# Patient Record
Sex: FEMALE | Race: WHITE | NOT HISPANIC OR LATINO | Employment: UNEMPLOYED | URBAN - METROPOLITAN AREA
[De-identification: names, ages, dates, MRNs, and addresses within clinical notes are randomized per-mention and may not be internally consistent; named-entity substitution may affect disease eponyms.]

---
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
## 2022-03-05 ENCOUNTER — HEALTH MAINTENANCE LETTER (OUTPATIENT)
Age: 22
End: 2022-03-05

## 2022-11-20 ENCOUNTER — HEALTH MAINTENANCE LETTER (OUTPATIENT)
Age: 22
End: 2022-11-20

## 2023-04-15 ENCOUNTER — HEALTH MAINTENANCE LETTER (OUTPATIENT)
Age: 23
End: 2023-04-15

## 2024-06-22 ENCOUNTER — HEALTH MAINTENANCE LETTER (OUTPATIENT)
Age: 24
End: 2024-06-22

## 2025-01-28 ENCOUNTER — TELEPHONE (OUTPATIENT)
Dept: ORTHOPEDICS | Facility: CLINIC | Age: 25
End: 2025-01-28
Payer: COMMERCIAL

## 2025-01-28 NOTE — TELEPHONE ENCOUNTER
Patient confirmed scheduled appointment:  Date: 2/3/2025  Time: 10:00am  Visit type: NEW KNEE  Provider:   [place] [facility] [place]

## 2025-01-28 NOTE — TELEPHONE ENCOUNTER
Patient Contacted and writer spoke to pt mother and mother stated she wanted to see  for second opinion. Writer gave pt mother information on next available appt with . Mother was told that writer was to schedule pt with  and was told about next available appt with . Mother was aware that she had to have C2C on file to schedule pt appt and mother said she will have daughter to call back and she the following:   Appointment type: NEW KNEE  Provider:   Return date: 2/3 or 2/5

## 2025-01-29 ENCOUNTER — TELEPHONE (OUTPATIENT)
Dept: ORTHOPEDICS | Facility: CLINIC | Age: 25
End: 2025-01-29
Payer: COMMERCIAL

## 2025-01-29 ENCOUNTER — PRE VISIT (OUTPATIENT)
Dept: ORTHOPEDICS | Facility: CLINIC | Age: 25
End: 2025-01-29
Payer: COMMERCIAL

## 2025-01-29 NOTE — TELEPHONE ENCOUNTER
Left a message for the patients family regarding questions they had about their upcoming appointment with Dr. Gregg. Call back number was given.     ANTONY Lopes

## 2025-01-29 NOTE — TELEPHONE ENCOUNTER
Pt mother called writer to see if she could speak to someone from  team about surgery. Writer told pt mother I will I have someone from his team to reach out to her and speak with them. Mother understood and agreed.

## 2025-01-29 NOTE — TELEPHONE ENCOUNTER
Action January 29, 2025 9:46 AM MT   Action Taken Sent a request for imaging from [facility] via [employer].       DIAGNOSIS: LEFT KNEE INJURY   APPOINTMENT DATE: 02/03/2025   NOTES STATUS DETAILS   OFFICE NOTE from referring provider SELF    OFFICE NOTE from other specialist Care Everywhere 01/23/2025 - Shoaib Levy MD -  Sports Med   DISCHARGE REPORT from the ER Care Everywhere 1/21/2025  [facility] EMERGENCY DEPARTMENT   MRI In process [facility]:  01/23/2025 - Left Knee   XRAYS (IMAGES & REPORTS) In process [facility]:  01/21/2025 - Left Knee  01/21/2025 - Left Tib-Fib

## 2025-01-30 ENCOUNTER — TELEPHONE (OUTPATIENT)
Dept: ORTHOPEDICS | Facility: CLINIC | Age: 25
End: 2025-01-30
Payer: COMMERCIAL

## 2025-01-30 NOTE — TELEPHONE ENCOUNTER
Other: Patient and her mom called and they are wanting to make sure that they got everything over to the provider for her 2/3/25 appointment. Please call patient.     Could we send this information to you in [employer] or would you prefer to receive a phone call?:   Patient would prefer a phone call   Okay to leave a detailed message?: Yes at Cell number on file:    Telephone Information:   Mobile [phone]   Mobile [phone]

## 2025-01-30 NOTE — TELEPHONE ENCOUNTER
Patient was called and informed that we received the imaging needed for her upcoming visit with Dr. Gregg on Monday.     Mich Connors CMA

## 2025-02-03 ENCOUNTER — OFFICE VISIT (OUTPATIENT)
Dept: ORTHOPEDICS | Facility: CLINIC | Age: 25
End: 2025-02-03
Payer: COMMERCIAL

## 2025-02-03 ENCOUNTER — TELEPHONE (OUTPATIENT)
Dept: ORTHOPEDICS | Facility: CLINIC | Age: 25
End: 2025-02-03

## 2025-02-03 DIAGNOSIS — S83.412A SPRAIN OF MEDIAL COLLATERAL LIGAMENT OF LEFT KNEE, INITIAL ENCOUNTER: ICD-10-CM

## 2025-02-03 DIAGNOSIS — S83.512A RUPTURE OF ANTERIOR CRUCIATE LIGAMENT OF LEFT KNEE, INITIAL ENCOUNTER: Primary | ICD-10-CM

## 2025-02-03 DIAGNOSIS — S83.242A OTHER TEAR OF MEDIAL MENISCUS OF LEFT KNEE AS CURRENT INJURY, INITIAL ENCOUNTER: ICD-10-CM

## 2025-02-03 PROCEDURE — 99204 OFFICE O/P NEW MOD 45 MIN: CPT | Mod: GC | Performed by: ORTHOPAEDIC SURGERY

## 2025-02-03 NOTE — PROGRESS NOTES
"[facility]  ORTHOPAEDIC SURGERY CONSULT - HISTORY AND PHYSICAL    DATE OF CONSULT: 2/3/2025 11:06 AM    REQUESTING PROVIDER: No att. providers found    CC: Pain, Left knee    DATE OF INJURY: 1/21/2025    HISTORY OF PRESENT ILLNESS:   Silver Agee is a 24 year old who presents with Left knee pain and swelling. She was playing volleyball on 1/21/25 when she came down and her knee \"caved in\". She felt a pop and had pain and immediate swelling and could not continue. She was seen in the ED where x-rays were negative for osseous abnormality. She was placed in a knee immobilizer and given crutches. Currently, her pain and swelling have reduced. She rated her pain at 2/10 in morning, which improves with PT exercises. She used ice, rest, brace, crutches for symptom control. She was placed in PT, who gave her some home exercise during a virtual visit. She just graduated and stays in [place]. She plays volleyball recreationally, and works out in gym. She denies prior injury/surgery to the affected knee. She denies tingling/numbness in the affected knee.       PAST MEDICAL HISTORY:   No past medical history on file.    Patient denies any personal history of bleeding disorders, clotting disorders, or adverse reactions to anesthesia.    PAST SURGICAL HISTORY:   No past surgical history on file.      MEDICATIONS:   Prior to Admission medications    Not on File       ALLERGIES:   Polytrim [polymyxin b-trimethoprim]    FAMILY HISTORY:  Patient denies known family history of bleeding, clotting, or anesthesia related complications.     REVIEW OF SYSTEMS:   Otherwise, a 10-point reviews of systems was negative except as noted above in the HPI.     PHYSICAL EXAM:   There were no vitals taken for this visit.  General: Awake, alert, cooperative, no apparent distress, appears stated age  Musculoskeletal:  General: Alert, oriented, no distress. Cooperative and comfortable during exam.  Left Lower Extremity:  No hip " "pain with passive internal and external rotation. Extensor mechanism is intact. Active knee ROM: painful, upto 30 degree.  No ecchymosis or erythema noted about the knee. No previous incisions are noted. No soft tissue swelling. Moderate knee effusion. Tender on the medial joint line. Non tender on lateral joint line. Non tender on the pes anserine bursa. 2B Lachman's test. Varus and valgus stress test deferred due to pain.  Calf is soft to palpation. Distal neurovascular exam is within normal limits.    Distal neurovascular exam is within normal limits.     LABS:  CBC:  Lab Results   Component Value Date    WBC 6.6 04/25/2019    HGB 12.6 04/25/2019     04/25/2019     BMP:  No results found for: \"NA\", \"POTASSIUM\", \"CHLORIDE\", \"CO2\", \"BUN\", \"CR\", \"ANIONGAP\", \"CADE\", \"GLC\"  Inflammatory Markers:  Lab Results   Component Value Date    WBC 6.6 04/25/2019       No results found for: \"INR\"  No results found for: \"GLC\"    IMAGING:  Review of MRI- Left knee from 01/23/2025 demonstrate:   IMPRESSION:   1. Full-thickness tear of the anterior cruciate ligament.   2.  Multiplanar, primarily horizontal tear of the posterior horn of the medial meniscus.   3.  There is possible fraying of the lateral meniscus posterior root ligament.   4.  Grade 2 injury of the MCL.   5.  Osseous contusions at the sulcus terminalis in the lateral femoral condyle and the posterior tibial plateau laterally and medially.   6.  Popliteus strain.     Assessment:   1. Complete ACL tear, Left knee  2. Medial meniscal posterior horn tear, Left camden  3. Grade 2 MCL sprain.     Plan:  We discussed the clinical and radiographic findings. We discussed the diagnosis and questions were answered. We discussed management options.   We discussed a surgical procedure of an arthroscopic-assisted ACL reconstruction. I described the procedure in detail. We discussed graft options, including patellar tendon versus quadriceps autograft tissue. We discussed the " specific risks of allograft tissue. Questions were answered regarding this. We discussed the risks and potential complications of surgery including continued pain, infection, blood vessel or nerve injury, swelling, fracture, failure of the graft, recurrent instability, development of degenerative joint disease, or the potential need for further surgery. We also discussed treatment of meniscus tears including partial menisectomy, meniscus repair, and benign neglect. We discussed the indications for meniscal repair. We discussed healing rates of meniscal tissue. We discussed the ramifications of a partial meniscectomy. We discussed the typical rehabilitation process following surgery and her questions were answered.   At this point the patient would like to discuss with family and  decide whether to have surgery with us or in [place],where she lives. She also has a trip to [place] coming up in 5 weeks. We discussed about working with PT to improve her knee motion and get the surgery done after she returns from her trip.   Patient to call and schedule surgery if she wants to get it done here.       Tee Romero MD  Orthopaedic Sports Medicine Fellow     For questions about this patient during weekday business hours, please attempt to contact me at my pager prior to contacting the Orthopaedic Surgery resident on call. On the weekends and overnight, please page the Orthopaedic Surgery resident on call. Thank you!

## 2025-02-03 NOTE — LETTER
"2/3/2025      Silver Agee  [address] [address]      Dear Colleague,    Thank you for referring your patient, Silver Agee, to the [facility] [place]. Please see a copy of my visit note below.    [facility]  ORTHOPAEDIC SURGERY CONSULT - HISTORY AND PHYSICAL    DATE OF CONSULT: 2/3/2025 11:06 AM    REQUESTING PROVIDER: No att. providers found    CC: Pain, Left knee    DATE OF INJURY: 1/21/2025    HISTORY OF PRESENT ILLNESS:   Silver Agee is a 24 year old who presents with Left knee pain and swelling. She was playing volleyball on 1/21/25 when she came down and her knee \"caved in\". She felt a pop and had pain and immediate swelling and could not continue. She was seen in the ED where x-rays were negative for osseous abnormality. She was placed in a knee immobilizer and given crutches. Currently, her pain and swelling have reduced. She rated her pain at 2/10 in morning, which improves with PT exercises. She used ice, rest, brace, crutches for symptom control. She was placed in PT, who gave her some home exercise during a virtual visit. She just graduated and stays in [place]. She plays volleyball recreationally, and works out in gym. She denies prior injury/surgery to the affected knee. She denies tingling/numbness in the affected knee.       PAST MEDICAL HISTORY:   No past medical history on file.    Patient denies any personal history of bleeding disorders, clotting disorders, or adverse reactions to anesthesia.    PAST SURGICAL HISTORY:   No past surgical history on file.      MEDICATIONS:   Prior to Admission medications    Not on File       ALLERGIES:   Polytrim [polymyxin b-trimethoprim]    FAMILY HISTORY:  Patient denies known family history of bleeding, clotting, or anesthesia related complications.     REVIEW OF SYSTEMS:   Otherwise, a 10-point reviews of systems was negative except as noted above in the HPI. " "    PHYSICAL EXAM:   There were no vitals taken for this visit.  General: Awake, alert, cooperative, no apparent distress, appears stated age  Musculoskeletal:  General: Alert, oriented, no distress. Cooperative and comfortable during exam.  Left Lower Extremity:  No hip pain with passive internal and external rotation. Extensor mechanism is intact. Active knee ROM: painful, upto 30 degree.  No ecchymosis or erythema noted about the knee. No previous incisions are noted. No soft tissue swelling. Moderate knee effusion. Tender on the medial joint line. Non tender on lateral joint line. Non tender on the pes anserine bursa. 2B Lachman's test. Varus and valgus stress test deferred due to pain.  Calf is soft to palpation. Distal neurovascular exam is within normal limits.    Distal neurovascular exam is within normal limits.     LABS:  CBC:  Lab Results   Component Value Date    WBC 6.6 04/25/2019    HGB 12.6 04/25/2019     04/25/2019     BMP:  No results found for: \"NA\", \"POTASSIUM\", \"CHLORIDE\", \"CO2\", \"BUN\", \"CR\", \"ANIONGAP\", \"CADE\", \"GLC\"  Inflammatory Markers:  Lab Results   Component Value Date    WBC 6.6 04/25/2019       No results found for: \"INR\"  No results found for: \"GLC\"    IMAGING:  Review of MRI- Left knee from 01/23/2025 demonstrate:   IMPRESSION:   1. Full-thickness tear of the anterior cruciate ligament.   2.  Multiplanar, primarily horizontal tear of the posterior horn of the medial meniscus.   3.  There is possible fraying of the lateral meniscus posterior root ligament.   4.  Grade 2 injury of the MCL.   5.  Osseous contusions at the sulcus terminalis in the lateral femoral condyle and the posterior tibial plateau laterally and medially.   6.  Popliteus strain.     Assessment:   1. Complete ACL tear, Left knee  2. Medial meniscal posterior horn tear, Left camden  3. Grade 2 MCL sprain.     Plan:  We discussed the clinical and radiographic findings. We discussed the diagnosis and questions were " answered. We discussed management options.   We discussed a surgical procedure of an arthroscopic-assisted ACL reconstruction. I described the procedure in detail. We discussed graft options, including patellar tendon versus quadriceps autograft tissue. We discussed the specific risks of allograft tissue. Questions were answered regarding this. We discussed the risks and potential complications of surgery including continued pain, infection, blood vessel or nerve injury, swelling, fracture, failure of the graft, recurrent instability, development of degenerative joint disease, or the potential need for further surgery. We also discussed treatment of meniscus tears including partial menisectomy, meniscus repair, and benign neglect. We discussed the indications for meniscal repair. We discussed healing rates of meniscal tissue. We discussed the ramifications of a partial meniscectomy. We discussed the typical rehabilitation process following surgery and her questions were answered.   At this point the patient would like to discuss with family and  decide whether to have surgery with us or in [place],where she lives. She also has a trip to [place] coming up in 5 weeks. We discussed about working with PT to improve her knee motion and get the surgery done after she returns from her trip.   Patient to call and schedule surgery if she wants to get it done here.       Tee Romero MD  Orthopaedic Sports Medicine Fellow     For questions about this patient during weekday business hours, please attempt to contact me at my pager prior to contacting the Orthopaedic Surgery resident on call. On the weekends and overnight, please page the Orthopaedic Surgery resident on call. Thank you!      Patient seen and examined with the fellow. I also personally reviewed the images and interpreted the imaging myself.     Assesment: Grade 3 rupture of the anterior cruciate ligament left knee, moderate grade sprain of the medial collateral  ligament left knee no definitive meniscus tears     Plan: Long discussion with the patient.  Viewed the diagnosis but of treatment options.  Offered ACL reconstruction quad tendon versus patellar tendon autograft.  We discussed the pros cons risk benefits of surgery versus none surgery.  We discussed expected course becoming alternative treatment options.  I do not think the MCL will require any surgical intervention however we will not know this till we areDoing the surgery.  Is a decision will be made intraoperatively.    We discussed the pros cons risk benefits of surgery versus none surgery.  Discussed expected course becoming alternative treatment options.  Will look for time to schedule to get this completed.  The patient does largely live in [place] and there will be a decision point that the family needs to make whether he would like surgery here in the [facility] or back in medicine.  I told him that I support needs away happy to help out.    If they are interested with surgery with me here at the [facility] I did offer them enrollment in the stability2 trial as I think this would be patient would be a good candidate.  However they need to decide the site of surgery first.    I agree with history, physical and imaging as well as the assessment and plan as detailed by Dr. Romero.         Again, thank you for allowing me to participate in the care of your patient.      Sincerely,    Johnathan Gregg MD    Electronically signed

## 2025-02-03 NOTE — PROGRESS NOTES
Patient seen and examined with the fellow. I also personally reviewed the images and interpreted the imaging myself.     Assesment: Grade 3 rupture of the anterior cruciate ligament left knee, moderate grade sprain of the medial collateral ligament left knee no definitive meniscus tears     Plan: Long discussion with the patient.  Viewed the diagnosis but of treatment options.  Offered ACL reconstruction quad tendon versus patellar tendon autograft.  We discussed the pros cons risk benefits of surgery versus none surgery.  We discussed expected course becoming alternative treatment options.  I do not think the MCL will require any surgical intervention however we will not know this till we areDoing the surgery.  Is a decision will be made intraoperatively.    We discussed the pros cons risk benefits of surgery versus none surgery.  Discussed expected course becoming alternative treatment options.  Will look for time to schedule to get this completed.  The patient does largely live in [place] and there will be a decision point that the family needs to make whether he would like surgery here in the [facility] or back in medicine.  I told him that I support needs away happy to help out.    If they are interested with surgery with me here at the [facility] I did offer them enrollment in the stability2 trial as I think this would be patient would be a good candidate.  However they need to decide the site of surgery first.    I agree with history, physical and imaging as well as the assessment and plan as detailed by Dr. Romero.

## 2025-02-03 NOTE — NURSING NOTE
Reason For Visit:   Chief Complaint   Patient presents with    Consult     Left knee          ?  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  Currently working? No.  Work status?   Student .  Date of injury: A week and a half ago  Type of injury: Volleyball injury .  Date of surgery: N/A  Type of surgery: N/A.  Smoker: No  Request smoking cessation information: No    SANE Score  Left Knee: 0  Right Knee: 100    Pain Assessment  Patient Currently in Pain: Denies    There were no vitals taken for this visit.         Allergies   Allergen Reactions    Polytrim [Polymyxin B-Trimethoprim] Swelling       No current outpatient medications on file.     No current facility-administered medications for this visit.         Mich Connors, CMA

## 2025-02-03 NOTE — TELEPHONE ENCOUNTER
Procedure: ACL and meniscus surgery  Facility: [facility]  Length: 90 minutes  Anesthesia: Choice  Post-op appointments needed: 2 weeks provider only, 6 weeks with provider only.  Surgery packet/instructions given to patient?  No    May have surgery in [place]    Fransisca Reina RN

## 2025-02-05 NOTE — TELEPHONE ENCOUNTER
Patient is scheduled for surgery with Dr. Gregg    Spoke with: Patient    Date of Surgery: 3/18/25    Location: ASC    Post ops: 1 week with PA, 6 weeks with MD    Pre op with Provider: Complete    H&P: Will schedule in [place]    Additional imaging/appointments: N/A    Surgery packet: Would like via [employer]     Additional comments: N/A        Debbie Durant MA on 2/5/2025 at 11:12 AM

## 2025-02-06 PROBLEM — S83.512A RUPTURE OF ANTERIOR CRUCIATE LIGAMENT OF LEFT KNEE, INITIAL ENCOUNTER: Status: ACTIVE | Noted: 2025-02-03

## 2025-02-25 NOTE — TELEPHONE ENCOUNTER
FUTURE VISIT INFORMATION      SURGERY INFORMATION:  Date: 3/18/25  Location:  or  Surgeon:  Johnathan Gregg MD   Anesthesia Type:  choice  Procedure: Examination under anesthesia, knee arthroscopy, bone tendon bone autograft versus quad tendon autograft anterior cruciate ligament reconstruction, meniscus surgery.possible extra articular tenodesis   Consult: ov 2/3/25    RECORDS REQUESTED FROM:       Primary Care Provider: Priscilla Lainez MD

## 2025-03-04 ENCOUNTER — PREP FOR PROCEDURE (OUTPATIENT)
Dept: ORTHOPEDICS | Facility: CLINIC | Age: 25
End: 2025-03-04
Payer: COMMERCIAL

## 2025-03-17 ENCOUNTER — PRE VISIT (OUTPATIENT)
Dept: SURGERY | Facility: CLINIC | Age: 25
End: 2025-03-17

## 2025-03-17 ENCOUNTER — OFFICE VISIT (OUTPATIENT)
Dept: SURGERY | Facility: CLINIC | Age: 25
End: 2025-03-17
Payer: COMMERCIAL

## 2025-03-17 ENCOUNTER — LAB (OUTPATIENT)
Dept: LAB | Facility: CLINIC | Age: 25
End: 2025-03-17
Payer: COMMERCIAL

## 2025-03-17 ENCOUNTER — ANESTHESIA EVENT (OUTPATIENT)
Dept: SURGERY | Facility: AMBULATORY SURGERY CENTER | Age: 25
End: 2025-03-17
Payer: COMMERCIAL

## 2025-03-17 VITALS
TEMPERATURE: 98.3 F | RESPIRATION RATE: 16 BRPM | BODY MASS INDEX: 26.07 KG/M2 | DIASTOLIC BLOOD PRESSURE: 66 MMHG | WEIGHT: 176 LBS | HEIGHT: 69 IN | SYSTOLIC BLOOD PRESSURE: 106 MMHG | HEART RATE: 68 BPM | OXYGEN SATURATION: 98 %

## 2025-03-17 DIAGNOSIS — S83.512A RUPTURE OF ANTERIOR CRUCIATE LIGAMENT OF LEFT KNEE, INITIAL ENCOUNTER: ICD-10-CM

## 2025-03-17 DIAGNOSIS — Z01.818 PREOP EXAMINATION: ICD-10-CM

## 2025-03-17 DIAGNOSIS — Z01.818 PREOP EXAMINATION: Primary | ICD-10-CM

## 2025-03-17 LAB
ANION GAP SERPL CALCULATED.3IONS-SCNC: 10 MMOL/L (ref 7–15)
BUN SERPL-MCNC: 13.9 MG/DL (ref 6–20)
CALCIUM SERPL-MCNC: 9.8 MG/DL (ref 8.8–10.4)
CHLORIDE SERPL-SCNC: 102 MMOL/L (ref 98–107)
CREAT SERPL-MCNC: 0.81 MG/DL (ref 0.51–0.95)
EGFRCR SERPLBLD CKD-EPI 2021: >90 ML/MIN/1.73M2
ERYTHROCYTE [DISTWIDTH] IN BLOOD BY AUTOMATED COUNT: 12.5 % (ref 10–15)
GLUCOSE SERPL-MCNC: 85 MG/DL (ref 70–99)
HCO3 SERPL-SCNC: 27 MMOL/L (ref 22–29)
HCT VFR BLD AUTO: 40.4 % (ref 35–47)
HGB BLD-MCNC: 13.4 G/DL (ref 11.7–15.7)
MCH RBC QN AUTO: 28.7 PG (ref 26.5–33)
MCHC RBC AUTO-ENTMCNC: 33.2 G/DL (ref 31.5–36.5)
MCV RBC AUTO: 87 FL (ref 78–100)
PLATELET # BLD AUTO: 193 10E3/UL (ref 150–450)
POTASSIUM SERPL-SCNC: 3.8 MMOL/L (ref 3.4–5.3)
RBC # BLD AUTO: 4.67 10E6/UL (ref 3.8–5.2)
SODIUM SERPL-SCNC: 139 MMOL/L (ref 135–145)
WBC # BLD AUTO: 7.9 10E3/UL (ref 4–11)

## 2025-03-17 PROCEDURE — 99203 OFFICE O/P NEW LOW 30 MIN: CPT | Performed by: PHYSICIAN ASSISTANT

## 2025-03-17 PROCEDURE — 85027 COMPLETE CBC AUTOMATED: CPT | Performed by: PATHOLOGY

## 2025-03-17 PROCEDURE — 80048 BASIC METABOLIC PNL TOTAL CA: CPT | Performed by: PATHOLOGY

## 2025-03-17 PROCEDURE — 36415 COLL VENOUS BLD VENIPUNCTURE: CPT | Performed by: PATHOLOGY

## 2025-03-17 ASSESSMENT — LIFESTYLE VARIABLES: TOBACCO_USE: 0

## 2025-03-17 ASSESSMENT — ENCOUNTER SYMPTOMS: SEIZURES: 0

## 2025-03-17 ASSESSMENT — PAIN SCALES - GENERAL: PAINLEVEL_OUTOF10: NO PAIN (0)

## 2025-03-17 NOTE — ANESTHESIA PREPROCEDURE EVALUATION
"Anesthesia Pre-Procedure Evaluation    Patient: Silver Agee   MRN: [id number] : 2000        Procedure : Procedure(s):  Examination under anesthesia, knee arthroscopy, bone tendon bone autograft  versus quad tendon autograft anterior cruciate ligament reconstruction, possible extra articular tenodesis  meniscus surgery.          Past Medical History:   Diagnosis Date    Rupture of anterior cruciate ligament of left knee 2025      No past surgical history on file.   Allergies   Allergen Reactions    Polymyxin B-Trimethoprim Swelling    Polytrim [Polymyxin B-Trimethoprim] Swelling      Social History     Tobacco Use    Smoking status: Never    Smokeless tobacco: Never   Substance Use Topics    Alcohol use: No      Wt Readings from Last 1 Encounters:   25 79.8 kg (176 lb)        Anesthesia Evaluation   Pt has not had prior anesthetic         ROS/MED HX  ENT/Pulmonary:  - neg pulmonary ROS     Neurologic:  - neg neurologic ROS     Cardiovascular:  - neg cardiovascular ROS     METS/Exercise Tolerance:     Hematologic:  - neg hematologic  ROS     Musculoskeletal:  - neg musculoskeletal ROS     GI/Hepatic:  - neg GI/hepatic ROS     Renal/Genitourinary:  - neg Renal ROS     Endo:  - neg endo ROS     Psychiatric/Substance Use:  - neg psychiatric ROS     Infectious Disease:  - neg infectious disease ROS     Malignancy:  - neg malignancy ROS     Other:  - neg other ROS          Physical Exam    Airway        Mallampati: I   TM distance: > 3 FB   Neck ROM: full   Mouth opening: > 3 cm    Respiratory Devices and Support         Dental       (+) Minor Abnormalities - some fillings, tiny chips      Cardiovascular   cardiovascular exam normal          Pulmonary   pulmonary exam normal                OUTSIDE LABS:  CBC:   Lab Results   Component Value Date    WBC 6.6 2019    HGB 12.6 2019    HGB 13.3 2017    HCT 38.2 2019     2019     BMP: No results found for: \"NA\", " "\"POTASSIUM\", \"CHLORIDE\", \"CO2\", \"BUN\", \"CR\", \"GLC\"  COAGS: No results found for: \"PTT\", \"INR\", \"FIBR\"  POC:   Lab Results   Component Value Date    HCG Negative 04/25/2019     HEPATIC: No results found for: \"ALBUMIN\", \"PROTTOTAL\", \"ALT\", \"AST\", \"GGT\", \"ALKPHOS\", \"BILITOTAL\", \"BILIDIRECT\", \"ALTAF\"  OTHER: No results found for: \"PH\", \"LACT\", \"A1C\", \"CADE\", \"PHOS\", \"MAG\", \"LIPASE\", \"AMYLASE\", \"TSH\", \"T4\", \"T3\", \"CRP\", \"SED\"    Anesthesia Plan    ASA Status:  1    NPO Status:  NPO Appropriate    Anesthesia Type: General.     - Airway: LMA   Induction: Intravenous, Propofol.   Maintenance: Balanced.        Consents    Anesthesia Plan(s) and associated risks, benefits, and realistic alternatives discussed. Questions answered and patient/representative(s) expressed understanding.     - Discussed: Risks, Benefits and Alternatives for BOTH SEDATION and the PROCEDURE were discussed     - Discussed with:  Patient      - Extended Intubation/Ventilatory Support Discussed: No.      - Patient is DNR/DNI Status: No     Use of blood products discussed: No .     Postoperative Care    Pain management: IV analgesics, Oral pain medications, Multi-modal analgesia, Peripheral nerve block (Single Shot).   PONV prophylaxis: Ondansetron (or other 5HT-3), Dexamethasone or Solumedrol, Background Propofol Infusion     Comments:               Dudley Reyes MD    I have reviewed the pertinent notes and labs in the chart from the past 30 days and (re)examined the patient.  Any updates or changes from those notes are reflected in this note.    Clinically Significant Risk Factors Present on Admission                             # Overweight: Estimated body mass index is 25.99 kg/m  as calculated from the following:    Height as of 3/17/25: 1.753 m (5' 9\").    Weight as of 3/17/25: 79.8 kg (176 lb).                "

## 2025-03-17 NOTE — PATIENT INSTRUCTIONS
Preparing for Your Surgery      Name:  Silver Agee   MRN:  [id number]   :  2000   Today's Date:  3/17/2025         Arriving for surgery:  Surgery date:  3/18/25  Arrival time:  6:30 am  Surgery time: 8:00 am    Restrictions due to COVID 19:    Please maintain social distance.  Masking is optional        parking is available for anyone with mobility limitations or disabilities. (Monday- Friday 7 am- 5 pm)    Please come to:    [facility]  [address] [place] [id number]    Check in on the 5th floor, Ambulatory Surgery Center.    What can I eat or drink?    -  You may eat and drink normally until 8 hours before arrival time  (Until 10:30 pm on 3/17/25)  -  You may have clear liquids until 2 hours before arrival time  (Until 4:30 am on 3/18/25)    Examples of clear liquids:  Water  Clear broth  Juices (apple, white grape, white cranberry  and cider) without pulp  Noncarbonated, powder based beverages  (lemonade and Dequan-Aid)  Sodas (Sprite, 7-Up, ginger ale and seltzer)  Coffee or tea (without milk or cream)  Gatorade    --No alcohol or cannabis products for at least 24 hours before surgery    Which medicines can I take?    Hold Aspirin for 7 days before surgery.   Hold Multivitamins for 7 days before surgery.  Hold Supplements for 7 days before surgery.  Hold Ibuprofen (Advil, Motrin) for 1 day before surgery--unless otherwise directed by surgeon.  Hold Naproxen (Aleve) for 4 days before surgery.      How do I prepare myself?  - Please take 2 showers (one the night prior to surgery and one the morning of surgery) using Scrubcare or Hibiclens soap.    Use this soap only from the neck to your toes. Avoid genital area      Leave the soap on your skin for one minute--then rinse thoroughly.      You may use your own shampoo and conditioner; no other hair products.   - Please remove all jewelry and body piercings.  - No lotions, deodorants or fragrance.  - No makeup or  fingernail polish.   - Bring your ID and insurance card.    -If you have a Deep Brain Stimulator, a Spinal Cord Stimulator or any implanted Neuro device you must bring the remote to the Surgery Center          ALL PATIENTS ARE REQUIRED TO HAVE A RESPONSIBLE ADULT TO DRIVE AND BE IN ATTENDANCE WITH THEM FOR 24 HOURS FOLLOWING SURGERY       Covid testing policy as of 12/06/2022  Your surgeon will notify and schedule you for a COVID test if one is needed before surgery--please direct any questions or COVID symptoms to your surgeon      Questions or Concerns:    -For questions regarding the day of surgery please contact the Ambulatory Surgery Center at [phone].    -If you have health changes between today and your surgery please contact your surgeon.     For questions after surgery please call your surgeons office.

## 2025-03-17 NOTE — H&P
Pre-Operative H & P     CC:  Preoperative exam to assess for increased cardiopulmonary risk while undergoing surgery and anesthesia.    Date of Encounter: 3/17/2025  Primary Care Physician:  Priscilla Lainez     Reason for visit:   Encounter Diagnoses   Name Primary?    Rupture of anterior cruciate ligament of left knee, initial encounter Yes    Preop examination        HPI  Silver Agee is a 25 year old female who presents for pre-operative H & P in preparation for  Procedure Information       Case: [id number] Date/Time: 03/18/25 0800    Procedures:       Examination under anesthesia, knee arthroscopy, bone tendon bone autograft (Left: Knee)      versus quad tendon autograft anterior cruciate ligament reconstruction, possible extra articular tenodesis (Left: Knee)      meniscus surgery. (Left: Knee)    Anesthesia type: General    Diagnosis: Rupture of anterior cruciate ligament of left knee, initial encounter [S83.512A]    Pre-op diagnosis: Rupture of anterior cruciate ligament of left knee, initial encounter [S83.512A]    Location: [place] [address] / [facility]    Providers: Johnatahn Gregg MD            Ms. Agee sustained a left knee injury while playing volleyball in January. Imaging demonstrates a complete ACL tear, a medial meniscal posterior horn tear and a grade 2 MCL sprain. She has been counseled by Dr. Gregg and now presents for the above procedure.    PMH is otherwise unremarkable.     History is obtained from the patient and chart review. She is accompanied by her parents.    Hx of abnormal bleeding or anti-platelet use: denies    Menstrual history: No LMP recorded. (Menstrual status: IUD).:       Past Medical History  Past Medical History:   Diagnosis Date    Rupture of anterior cruciate ligament of left knee 02/2025       Past Surgical History  History reviewed. No pertinent surgical history.    Prior to Admission  Medications  Current Outpatient Medications   Medication Sig Dispense Refill    levonorgestrel (MIRENA) 52 MG (20 mcg/day) IUD by Intrauterine route once.         Allergies  Allergies   Allergen Reactions    Polymyxin B-Trimethoprim Swelling    Polytrim [Polymyxin B-Trimethoprim] Swelling       Social History  Social History     Socioeconomic History    Marital status: Single     Spouse name: Not on file    Number of children: Not on file    Years of education: Not on file    Highest education level: Not on file   Occupational History    Not on file   Tobacco Use    Smoking status: Never    Smokeless tobacco: Never   Substance and Sexual Activity    Alcohol use: No    Drug use: No    Sexual activity: Not on file   Other Topics Concern    Not on file   Social History Narrative    FAMILY INFORMATION     Date: 2006    Parent #1      Name: Suzy Leyva   Gender: Female   : 62      Education: PHD   Occupation:         Parent #2      Name: Milton Agee   Gender: Male    : 56    Education:PHD  Occupation: [employer]        Siblings: none        Relationship Status of Parent(s):     Who does the child live with? parents    What language(s) is/are spoken at home? english         Social Drivers of Health     Financial Resource Strain: Not on file   Food Insecurity: No Food Insecurity (2025)    [facility] [place] [facility] [place]    Hunger Vital Sign     Worried About Running Out of Food in the Last Year: Never true     Ran Out of Food in the Last Year: Never true   Transportation Needs: Not on file   Physical Activity: Not on file   Stress: Not on file   Social Connections: Not on file   Interpersonal Safety: Not on file   Housing Stability: Not on file       Family History  Family History   Problem Relation Age of Onset    Anesthesia Reaction Mother         PONV    Factor V Leiden deficiency Mother         No h/o thrombus     "Hypertension Father     Diabetes Maternal Grandmother     Hypertension Maternal Grandmother     [email or web address]. Maternal Grandmother     Cerebrovascular Disease Maternal Grandfather     PAXTONA.D. Paternal Grandmother     Depression Maternal Aunt     Thyroid Disease Maternal Aunt     Glaucoma No family hx of     Macular Degeneration No family hx of        Review of Systems  The complete review of systems is negative other than noted in the HPI or here.     Anesthesia Evaluation   Pt has not had prior anesthetic         ROS/MED HX  ENT/Pulmonary:  - neg pulmonary ROS  (-) tobacco use, asthma, sleep apnea and recent URI   Neurologic:  - neg neurologic ROS  (-) no seizures and no CVA   Cardiovascular:       METS/Exercise Tolerance: >4 METS    Hematologic:  - neg hematologic  ROS  (-) history of blood clots and history of blood transfusion   Musculoskeletal: Comment: Complete left ACL tear and left medial meniscal posterior horn tear, acute        GI/Hepatic:  - neg GI/hepatic ROS  (-) GERD and liver disease   Renal/Genitourinary:  - neg Renal ROS  (-) renal disease   Endo:  - neg endo ROS  (-) Type II DM   Psychiatric/Substance Use:  - neg psychiatric ROS     Infectious Disease:  - neg infectious disease ROS     Malignancy:  - neg malignancy ROS     Other:  - neg other ROS          /66 (BP Location: Right arm, Patient Position: Sitting, Cuff Size: Adult Large)   Pulse 68   Temp 98.3  F (36.8  C) (Oral)   Resp 16   Ht 1.753 m (5' 9\")   Wt 79.8 kg (176 lb)   SpO2 98%   Breastfeeding No   BMI 25.99 kg/m      Physical Exam  Constitutional: Awake, alert, cooperative, no apparent distress, and appears stated age.  Eyes: Pupils equal, round and reactive to light, extra ocular muscles intact, sclera clear, conjunctiva normal.  HENT: Normocephalic, oral pharynx with moist mucus membranes, good dentition. No goiter appreciated. No removable dental hardware.  Respiratory: Clear to auscultation bilaterally, no crackles or " wheezing. No SOB when supine.  Cardiovascular: Regular rate and rhythm, normal S1 and S2, and no murmur noted.  Carotids +2, no bruits. No edema. Palpable pulses to radial, DP and PT arteries.   GI: Normal bowel sounds, soft, non-distended, non-tender, no masses palpated.    Lymph/Hematologic: No cervical lymphadenopathy and no supraclavicular lymphadenopathy.  Genitourinary:  deferred  Skin: Warm and dry.  No rashes.   Musculoskeletal: full ROM of neck. There is no redness, warmth, or swelling of the joints. Gross motor strength is normal.  Wearing brace on LLE.  Neurologic: Awake, alert, oriented to name, place and time. Cranial nerves II-XII are grossly intact. Gait is normal. Ambulates from chair to exam table, seats self, lies supine and sits back up w/o assistance.  Neuropsychiatric: Calm, cooperative. Normal affect. Pleasant. Answers questions appropriately, follows commands w/o difficulty.        PRIOR LABS/DIAGNOSTIC STUDIES:    All pertinent records, results, labs and imaging personally reviewed        LAB/DIAGNOSTIC STUDIES TODAY:  BMP, CBC    Assessment    Silver Agee is a 25 year old female seen as a PAC referral for risk assessment and optimization for anesthesia.    Plan/Recommendations  Pt will be optimized for the proposed procedure.  See below for details on the assessment, risk, and preoperative recommendations    NEUROLOGY  - No history of TIA, CVA or seizure    -Post Op delirium risk factors:  No risk identified    ENT  - No current airway concerns.  Will need to be reassessed day of surgery.  Mallampati: II  TM: > 3    CARDIAC  - No history of CAD, Hypertension, and Afib  - METS (Metabolic Equivalents)  Patient performs 4 or more METS exercise without symptoms             Total Score: 0      RCRI-Very low risk: Class 1 0.4% complication rate             Total Score: 0        PULMONARY  SHARIF Low Risk             Total Score: 0      - Denies asthma or inhaler use  - Tobacco History    History  "  Smoking Status    Never   Smokeless Tobacco    Never       GI  - Denies GERD  PONV High Risk  Total Score: 3           1 AN PONV: Pt is Female    1 AN PONV: Patient is not a current smoker    1 AN PONV: Intended Post Op Opioids          ENDOCRINE    - BMI: Estimated body mass index is 25.99 kg/m  as calculated from the following:    Height as of this encounter: 1.753 m (5' 9\").    Weight as of this encounter: 79.8 kg (176 lb).  Healthy Weight (BMI 18.5-24.9)  - No history of Diabetes Mellitus    HEME  VTE Low Risk 0.26%             Total Score: 0      - No history of abnormal bleeding or antiplatelet use.      MSK  Patient is NOT Frail             Total Score: 0      - Complete left ACL tear and left medial meniscal posterior horn tear, acute.      The patient is aware that the final anesthesia plan will be decided by the assigned anesthesia provider on the date of service.      The patient is optimized for their procedure. AVS with information on surgery time/arrival time, meds and NPO status given by nursing staff. No further diagnostic testing indicated.      On the day of service:     Prep time: 10 minutes  Visit time: 19 minutes  Documentation time: 9 minutes  ------------------------------------------  Total time: 38 minutes      Carly Mcneil PA-C  Preoperative Assessment Center  [facility]  Phone: [phone]  Fax: [phone]    "

## 2025-03-18 ENCOUNTER — ANESTHESIA (OUTPATIENT)
Dept: SURGERY | Facility: AMBULATORY SURGERY CENTER | Age: 25
End: 2025-03-18
Payer: COMMERCIAL

## 2025-03-18 ENCOUNTER — HOSPITAL ENCOUNTER (OUTPATIENT)
Facility: AMBULATORY SURGERY CENTER | Age: 25
Discharge: HOME | End: 2025-03-18
Attending: ORTHOPAEDIC SURGERY
Payer: COMMERCIAL

## 2025-03-18 VITALS
RESPIRATION RATE: 16 BRPM | OXYGEN SATURATION: 100 % | BODY MASS INDEX: 25.92 KG/M2 | TEMPERATURE: 97.3 F | WEIGHT: 175 LBS | SYSTOLIC BLOOD PRESSURE: 129 MMHG | DIASTOLIC BLOOD PRESSURE: 69 MMHG | HEIGHT: 69 IN | HEART RATE: 79 BPM

## 2025-03-18 DIAGNOSIS — S83.512A RUPTURE OF ANTERIOR CRUCIATE LIGAMENT OF LEFT KNEE, INITIAL ENCOUNTER: Primary | ICD-10-CM

## 2025-03-18 LAB
HCG UR QL: NEGATIVE
INTERNAL QC OK POCT: NORMAL
POCT KIT EXPIRATION DATE: NORMAL
POCT KIT LOT NUMBER: NORMAL

## 2025-03-18 PROCEDURE — 29888 ARTHRS AID ACL RPR/AGMNTJ: CPT | Mod: LT

## 2025-03-18 PROCEDURE — 29888 ARTHRS AID ACL RPR/AGMNTJ: CPT | Mod: LT | Performed by: ORTHOPAEDIC SURGERY

## 2025-03-18 PROCEDURE — C1713 ANCHOR/SCREW BN/BN,TIS/BN: HCPCS

## 2025-03-18 PROCEDURE — C1762 CONN TISS, HUMAN(INC FASCIA): HCPCS

## 2025-03-18 PROCEDURE — 81025 URINE PREGNANCY TEST: CPT | Performed by: PATHOLOGY

## 2025-03-18 PROCEDURE — 29882 ARTHRS KNE SRG MNISC RPR M/L: CPT | Mod: LT

## 2025-03-18 PROCEDURE — 29882 ARTHRS KNE SRG MNISC RPR M/L: CPT | Mod: LT | Performed by: ORTHOPAEDIC SURGERY

## 2025-03-18 DEVICE — IMPLANTABLE DEVICE
Type: IMPLANTABLE DEVICE | Site: KNEE | Status: FUNCTIONAL
Brand: FIBERSTITCH™ IMPLANT 1.5, CURVED WITH TWO POLYESTER IMPLANTS AND 2-0 FIBERWIRE®

## 2025-03-18 DEVICE — SUTURE FIBERLOOP ARTHX SUTURETAPE NDL NABSB BLK WHT AR-7534: Type: IMPLANTABLE DEVICE | Site: KNEE | Status: FUNCTIONAL

## 2025-03-18 DEVICE — QUADLINK IMPLANT SYSTEM, 10MM
Type: IMPLANTABLE DEVICE | Site: KNEE | Status: FUNCTIONAL
Brand: ARTHREX®

## 2025-03-18 RX ORDER — FENTANYL CITRATE 50 UG/ML
50 INJECTION, SOLUTION INTRAMUSCULAR; INTRAVENOUS EVERY 5 MIN PRN
Status: DISCONTINUED | OUTPATIENT
Start: 2025-03-18 | End: 2025-03-19 | Stop reason: HOSPADM

## 2025-03-18 RX ORDER — NALOXONE HYDROCHLORIDE 0.4 MG/ML
0.1 INJECTION, SOLUTION INTRAMUSCULAR; INTRAVENOUS; SUBCUTANEOUS
Status: DISCONTINUED | OUTPATIENT
Start: 2025-03-18 | End: 2025-03-19 | Stop reason: HOSPADM

## 2025-03-18 RX ORDER — FENTANYL CITRATE 50 UG/ML
INJECTION, SOLUTION INTRAMUSCULAR; INTRAVENOUS PRN
Status: DISCONTINUED | OUTPATIENT
Start: 2025-03-18 | End: 2025-03-18

## 2025-03-18 RX ORDER — NALOXONE HYDROCHLORIDE 0.4 MG/ML
0.2 INJECTION, SOLUTION INTRAMUSCULAR; INTRAVENOUS; SUBCUTANEOUS
Status: DISCONTINUED | OUTPATIENT
Start: 2025-03-18 | End: 2025-03-19 | Stop reason: HOSPADM

## 2025-03-18 RX ORDER — PROPOFOL 10 MG/ML
INJECTION, EMULSION INTRAVENOUS CONTINUOUS PRN
Status: DISCONTINUED | OUTPATIENT
Start: 2025-03-18 | End: 2025-03-18

## 2025-03-18 RX ORDER — SODIUM CHLORIDE, SODIUM LACTATE, POTASSIUM CHLORIDE, CALCIUM CHLORIDE 600; 310; 30; 20 MG/100ML; MG/100ML; MG/100ML; MG/100ML
INJECTION, SOLUTION INTRAVENOUS CONTINUOUS
Status: DISCONTINUED | OUTPATIENT
Start: 2025-03-18 | End: 2025-03-19 | Stop reason: HOSPADM

## 2025-03-18 RX ORDER — OXYCODONE HYDROCHLORIDE 5 MG/1
5 TABLET ORAL
Status: COMPLETED | OUTPATIENT
Start: 2025-03-18 | End: 2025-03-18

## 2025-03-18 RX ORDER — LIDOCAINE HYDROCHLORIDE 20 MG/ML
INJECTION, SOLUTION INFILTRATION; PERINEURAL PRN
Status: DISCONTINUED | OUTPATIENT
Start: 2025-03-18 | End: 2025-03-18

## 2025-03-18 RX ORDER — KETOROLAC TROMETHAMINE 30 MG/ML
INJECTION, SOLUTION INTRAMUSCULAR; INTRAVENOUS PRN
Status: DISCONTINUED | OUTPATIENT
Start: 2025-03-18 | End: 2025-03-18

## 2025-03-18 RX ORDER — LIDOCAINE 40 MG/G
CREAM TOPICAL
Status: DISCONTINUED | OUTPATIENT
Start: 2025-03-18 | End: 2025-03-19 | Stop reason: HOSPADM

## 2025-03-18 RX ORDER — ASPIRIN 81 MG/1
81 TABLET ORAL 2 TIMES DAILY
Qty: 60 TABLET | Refills: 0 | Status: SHIPPED | OUTPATIENT
Start: 2025-03-18

## 2025-03-18 RX ORDER — OXYCODONE HYDROCHLORIDE 5 MG/1
5 TABLET ORAL
Status: DISCONTINUED | OUTPATIENT
Start: 2025-03-18 | End: 2025-03-19 | Stop reason: HOSPADM

## 2025-03-18 RX ORDER — HYDROXYZINE HYDROCHLORIDE 25 MG/1
25 TABLET, FILM COATED ORAL
Status: COMPLETED | OUTPATIENT
Start: 2025-03-18 | End: 2025-03-18

## 2025-03-18 RX ORDER — ONDANSETRON 4 MG/1
4 TABLET, ORALLY DISINTEGRATING ORAL EVERY 30 MIN PRN
Status: DISCONTINUED | OUTPATIENT
Start: 2025-03-18 | End: 2025-03-19 | Stop reason: HOSPADM

## 2025-03-18 RX ORDER — LABETALOL HYDROCHLORIDE 5 MG/ML
10 INJECTION, SOLUTION INTRAVENOUS
Status: DISCONTINUED | OUTPATIENT
Start: 2025-03-18 | End: 2025-03-19 | Stop reason: HOSPADM

## 2025-03-18 RX ORDER — HYDROXYZINE HYDROCHLORIDE 25 MG/1
25 TABLET, FILM COATED ORAL 3 TIMES DAILY PRN
Qty: 20 TABLET | Refills: 0 | Status: SHIPPED | OUTPATIENT
Start: 2025-03-18

## 2025-03-18 RX ORDER — DEXAMETHASONE SODIUM PHOSPHATE 4 MG/ML
INJECTION, SOLUTION INTRA-ARTICULAR; INTRALESIONAL; INTRAMUSCULAR; INTRAVENOUS; SOFT TISSUE PRN
Status: DISCONTINUED | OUTPATIENT
Start: 2025-03-18 | End: 2025-03-18

## 2025-03-18 RX ORDER — OXYCODONE HYDROCHLORIDE 5 MG/1
10 TABLET ORAL
Status: DISCONTINUED | OUTPATIENT
Start: 2025-03-18 | End: 2025-03-19 | Stop reason: HOSPADM

## 2025-03-18 RX ORDER — HYDROMORPHONE HYDROCHLORIDE 1 MG/ML
0.2 INJECTION, SOLUTION INTRAMUSCULAR; INTRAVENOUS; SUBCUTANEOUS EVERY 5 MIN PRN
Status: DISCONTINUED | OUTPATIENT
Start: 2025-03-18 | End: 2025-03-19 | Stop reason: HOSPADM

## 2025-03-18 RX ORDER — OXYCODONE HYDROCHLORIDE 5 MG/1
5-10 TABLET ORAL EVERY 4 HOURS PRN
Qty: 20 TABLET | Refills: 0 | Status: SHIPPED | OUTPATIENT
Start: 2025-03-18

## 2025-03-18 RX ORDER — NALOXONE HYDROCHLORIDE 0.4 MG/ML
0.4 INJECTION, SOLUTION INTRAMUSCULAR; INTRAVENOUS; SUBCUTANEOUS
Status: DISCONTINUED | OUTPATIENT
Start: 2025-03-18 | End: 2025-03-19 | Stop reason: HOSPADM

## 2025-03-18 RX ORDER — HYDRALAZINE HYDROCHLORIDE 20 MG/ML
2.5-5 INJECTION INTRAMUSCULAR; INTRAVENOUS EVERY 10 MIN PRN
Status: DISCONTINUED | OUTPATIENT
Start: 2025-03-18 | End: 2025-03-19 | Stop reason: HOSPADM

## 2025-03-18 RX ORDER — ACETAMINOPHEN 325 MG/1
650 TABLET ORAL
Status: DISCONTINUED | OUTPATIENT
Start: 2025-03-18 | End: 2025-03-19 | Stop reason: HOSPADM

## 2025-03-18 RX ORDER — GLYCOPYRROLATE 0.2 MG/ML
INJECTION, SOLUTION INTRAMUSCULAR; INTRAVENOUS PRN
Status: DISCONTINUED | OUTPATIENT
Start: 2025-03-18 | End: 2025-03-18

## 2025-03-18 RX ORDER — DEXAMETHASONE SODIUM PHOSPHATE 10 MG/ML
4 INJECTION, SOLUTION INTRAMUSCULAR; INTRAVENOUS
Status: DISCONTINUED | OUTPATIENT
Start: 2025-03-18 | End: 2025-03-19 | Stop reason: HOSPADM

## 2025-03-18 RX ORDER — ACETAMINOPHEN 325 MG/1
650 TABLET ORAL EVERY 4 HOURS PRN
Qty: 50 TABLET | Refills: 0 | Status: SHIPPED | OUTPATIENT
Start: 2025-03-18

## 2025-03-18 RX ORDER — FENTANYL CITRATE 50 UG/ML
25-50 INJECTION, SOLUTION INTRAMUSCULAR; INTRAVENOUS
Status: DISCONTINUED | OUTPATIENT
Start: 2025-03-18 | End: 2025-03-19 | Stop reason: HOSPADM

## 2025-03-18 RX ORDER — BUPIVACAINE HYDROCHLORIDE 2.5 MG/ML
INJECTION, SOLUTION EPIDURAL; INFILTRATION; INTRACAUDAL; PERINEURAL
Status: COMPLETED | OUTPATIENT
Start: 2025-03-18 | End: 2025-03-18

## 2025-03-18 RX ORDER — FENTANYL CITRATE 50 UG/ML
25 INJECTION, SOLUTION INTRAMUSCULAR; INTRAVENOUS EVERY 5 MIN PRN
Status: DISCONTINUED | OUTPATIENT
Start: 2025-03-18 | End: 2025-03-19 | Stop reason: HOSPADM

## 2025-03-18 RX ORDER — ONDANSETRON 4 MG/1
4 TABLET, ORALLY DISINTEGRATING ORAL EVERY 8 HOURS PRN
Qty: 4 TABLET | Refills: 0 | Status: SHIPPED | OUTPATIENT
Start: 2025-03-18

## 2025-03-18 RX ORDER — ONDANSETRON 2 MG/ML
INJECTION INTRAMUSCULAR; INTRAVENOUS PRN
Status: DISCONTINUED | OUTPATIENT
Start: 2025-03-18 | End: 2025-03-18

## 2025-03-18 RX ORDER — FLUMAZENIL 0.1 MG/ML
0.2 INJECTION, SOLUTION INTRAVENOUS
Status: DISCONTINUED | OUTPATIENT
Start: 2025-03-18 | End: 2025-03-19 | Stop reason: HOSPADM

## 2025-03-18 RX ORDER — ONDANSETRON 2 MG/ML
4 INJECTION INTRAMUSCULAR; INTRAVENOUS EVERY 30 MIN PRN
Status: DISCONTINUED | OUTPATIENT
Start: 2025-03-18 | End: 2025-03-19 | Stop reason: HOSPADM

## 2025-03-18 RX ORDER — ACETAMINOPHEN 325 MG/1
975 TABLET ORAL ONCE
Status: COMPLETED | OUTPATIENT
Start: 2025-03-18 | End: 2025-03-18

## 2025-03-18 RX ORDER — BUPIVACAINE HYDROCHLORIDE AND EPINEPHRINE 2.5; 5 MG/ML; UG/ML
INJECTION, SOLUTION INFILTRATION; PERINEURAL PRN
Status: DISCONTINUED | OUTPATIENT
Start: 2025-03-18 | End: 2025-03-18 | Stop reason: HOSPADM

## 2025-03-18 RX ORDER — PROPOFOL 10 MG/ML
INJECTION, EMULSION INTRAVENOUS PRN
Status: DISCONTINUED | OUTPATIENT
Start: 2025-03-18 | End: 2025-03-18

## 2025-03-18 RX ORDER — HYDROMORPHONE HYDROCHLORIDE 1 MG/ML
0.4 INJECTION, SOLUTION INTRAMUSCULAR; INTRAVENOUS; SUBCUTANEOUS EVERY 5 MIN PRN
Status: DISCONTINUED | OUTPATIENT
Start: 2025-03-18 | End: 2025-03-19 | Stop reason: HOSPADM

## 2025-03-18 RX ORDER — CEFAZOLIN SODIUM 2 G/50ML
2 SOLUTION INTRAVENOUS
Status: COMPLETED | OUTPATIENT
Start: 2025-03-18 | End: 2025-03-18

## 2025-03-18 RX ORDER — AMOXICILLIN 250 MG
1-2 CAPSULE ORAL 2 TIMES DAILY
Qty: 30 TABLET | Refills: 0 | Status: SHIPPED | OUTPATIENT
Start: 2025-03-18

## 2025-03-18 RX ORDER — ONDANSETRON 4 MG/1
4 TABLET, ORALLY DISINTEGRATING ORAL
Status: DISCONTINUED | OUTPATIENT
Start: 2025-03-18 | End: 2025-03-19 | Stop reason: HOSPADM

## 2025-03-18 RX ORDER — SODIUM CHLORIDE, SODIUM LACTATE, POTASSIUM CHLORIDE, CALCIUM CHLORIDE 600; 310; 30; 20 MG/100ML; MG/100ML; MG/100ML; MG/100ML
INJECTION, SOLUTION INTRAVENOUS CONTINUOUS PRN
Status: DISCONTINUED | OUTPATIENT
Start: 2025-03-18 | End: 2025-03-18

## 2025-03-18 RX ORDER — CEFAZOLIN SODIUM 2 G/50ML
2 SOLUTION INTRAVENOUS SEE ADMIN INSTRUCTIONS
Status: DISCONTINUED | OUTPATIENT
Start: 2025-03-18 | End: 2025-03-19 | Stop reason: HOSPADM

## 2025-03-18 RX ADMIN — BUPIVACAINE HYDROCHLORIDE 10 ML: 2.5 INJECTION, SOLUTION EPIDURAL; INFILTRATION; INTRACAUDAL; PERINEURAL at 07:53

## 2025-03-18 RX ADMIN — FENTANYL CITRATE 50 MCG: 50 INJECTION, SOLUTION INTRAMUSCULAR; INTRAVENOUS at 10:18

## 2025-03-18 RX ADMIN — LIDOCAINE HYDROCHLORIDE 40 MG: 20 INJECTION, SOLUTION INFILTRATION; PERINEURAL at 08:14

## 2025-03-18 RX ADMIN — PROPOFOL 200 MCG/KG/MIN: 10 INJECTION, EMULSION INTRAVENOUS at 08:14

## 2025-03-18 RX ADMIN — ONDANSETRON 4 MG: 2 INJECTION INTRAMUSCULAR; INTRAVENOUS at 09:40

## 2025-03-18 RX ADMIN — PROPOFOL 80 MG: 10 INJECTION, EMULSION INTRAVENOUS at 08:20

## 2025-03-18 RX ADMIN — DEXAMETHASONE SODIUM PHOSPHATE 4 MG: 4 INJECTION, SOLUTION INTRA-ARTICULAR; INTRALESIONAL; INTRAMUSCULAR; INTRAVENOUS; SOFT TISSUE at 08:14

## 2025-03-18 RX ADMIN — PROPOFOL 100 MG: 10 INJECTION, EMULSION INTRAVENOUS at 09:43

## 2025-03-18 RX ADMIN — ACETAMINOPHEN 650 MG: 325 TABLET ORAL at 13:53

## 2025-03-18 RX ADMIN — SODIUM CHLORIDE, SODIUM LACTATE, POTASSIUM CHLORIDE, CALCIUM CHLORIDE: 600; 310; 30; 20 INJECTION, SOLUTION INTRAVENOUS at 08:08

## 2025-03-18 RX ADMIN — CEFAZOLIN SODIUM 2 G: 2 SOLUTION INTRAVENOUS at 08:14

## 2025-03-18 RX ADMIN — FENTANYL CITRATE 50 MCG: 50 INJECTION, SOLUTION INTRAMUSCULAR; INTRAVENOUS at 07:51

## 2025-03-18 RX ADMIN — KETOROLAC TROMETHAMINE 30 MG: 30 INJECTION, SOLUTION INTRAMUSCULAR; INTRAVENOUS at 09:42

## 2025-03-18 RX ADMIN — ONDANSETRON 4 MG: 2 INJECTION INTRAMUSCULAR; INTRAVENOUS at 12:05

## 2025-03-18 RX ADMIN — FENTANYL CITRATE 50 MCG: 50 INJECTION, SOLUTION INTRAMUSCULAR; INTRAVENOUS at 08:12

## 2025-03-18 RX ADMIN — OXYCODONE HYDROCHLORIDE 5 MG: 5 TABLET ORAL at 10:25

## 2025-03-18 RX ADMIN — SODIUM CHLORIDE, SODIUM LACTATE, POTASSIUM CHLORIDE, CALCIUM CHLORIDE: 600; 310; 30; 20 INJECTION, SOLUTION INTRAVENOUS at 07:06

## 2025-03-18 RX ADMIN — PROPOFOL 170 MG: 10 INJECTION, EMULSION INTRAVENOUS at 08:14

## 2025-03-18 RX ADMIN — PROPOFOL 50 MG: 10 INJECTION, EMULSION INTRAVENOUS at 09:34

## 2025-03-18 RX ADMIN — FENTANYL CITRATE 50 MCG: 50 INJECTION, SOLUTION INTRAMUSCULAR; INTRAVENOUS at 08:37

## 2025-03-18 RX ADMIN — HYDROXYZINE HYDROCHLORIDE 25 MG: 25 TABLET, FILM COATED ORAL at 11:58

## 2025-03-18 RX ADMIN — FENTANYL CITRATE 50 MCG: 50 INJECTION, SOLUTION INTRAMUSCULAR; INTRAVENOUS at 10:06

## 2025-03-18 RX ADMIN — GLYCOPYRROLATE 0.2 MG: 0.2 INJECTION, SOLUTION INTRAMUSCULAR; INTRAVENOUS at 08:42

## 2025-03-18 RX ADMIN — Medication 0.5 MG: at 09:32

## 2025-03-18 RX ADMIN — PROPOFOL 50 MG: 10 INJECTION, EMULSION INTRAVENOUS at 08:16

## 2025-03-18 RX ADMIN — ACETAMINOPHEN 975 MG: 325 TABLET ORAL at 07:04

## 2025-03-18 RX ADMIN — Medication 0.5 MG: at 09:56

## 2025-03-18 NOTE — ANESTHESIA CARE TRANSFER NOTE
Patient: Silver Agee    Procedure: Procedure(s):  Examination under anesthesia, knee arthroscopy  quad tendon autograft anterior cruciate ligament reconstruction  meniscus surgery.       Diagnosis: Rupture of anterior cruciate ligament of left knee, initial encounter [S83.512A]  Diagnosis Additional Information: No value filed.    Anesthesia Type:   General     Note:    Oropharynx: spontaneously breathing  Level of Consciousness: awake  Oxygen Supplementation: face mask  Level of Supplemental Oxygen (L/min / FiO2): 7  Independent Airway: airway patency satisfactory and stable  Dentition: dentition unchanged  Vital Signs Stable: post-procedure vital signs reviewed and stable  Report to RN Given: handoff report given  Patient transferred to: PACU    Handoff Report: Identifed the Patient, Identified the Reponsible Provider, Reviewed the pertinent medical history, Discussed the surgical course, Reviewed Intra-OP anesthesia mangement and issues during anesthesia, Set expectations for post-procedure period and Allowed opportunity for questions and acknowledgement of understanding    Vitals:  Vitals Value Taken Time   /89 03/18/25 1000   Temp 36.3  C (97.34  F) 03/18/25 1003   Pulse 90 03/18/25 1003   Resp 6 03/18/25 1003   SpO2 100 % 03/18/25 1003   Vitals shown include unfiled device data.    Electronically Signed By: ABNER Gramajo CRNA  March 18, 2025  10:04 AM

## 2025-03-18 NOTE — OP NOTE
PREOPERATIVE DIAGNOSIS:   Grade 3 rupture of the left anterior cruciate ligament  Questionable lateral meniscus tear    POSTOPERATIVE DIAGNOSIS:  Grade 3 rupture of the left anterior cruciate ligament  Lateral meniscus tear    PROCEDURE:  Examination under anesthesia left knee  Left knee arthroscopy  Quadricep tendon ACL reconstruction  Lateral meniscus repair    DATE OF SURGERY: 3/18/2025    SURGEON: Johnathan Gregg MD    ASSISTANT: Amanda Lopez PA-C. The assistance of Mrs. Lopez was necessary for positioning, arthroscopic visualization, retraction, graft preparation and graft passage. There was no qualified available resident or fellow who was aware of the intricies of the procedure and requirements of graft preparation.    RESIDENT OR FELLOW: Demetrius Anguiano MD    OPERATIVE INDICATIONS: Silver Agee is a pleasant 25 year old who I saw through my orthopedic clinic with a history, physical, imaging consistent with grade 3 rupture of the left anterior cruciate ligament as well as a potential tear of the lateral meniscus.  We discussed the pros cons risk benefits surgery.  I specifically discussed with the patient the stability2 trial and offered enrollment.  They agreed to participate.  They understand the randomize nature as well as the different groups..  I reviewed with the patient the risks, benefits, complications, techniques and alternatives to surgery.  We reviewed the expected course of recovery and the potential expected outcomes.  The patient understood both the risks and benefits and desired to proceed despite the risks.    OPERATIVE DETAILS: In the preoperative area the patient's informed consent was reviewed and they desired to proceed.  The left leg was marked and the patient was in agreement.  The patient was taken to the operating room where a timeout was performed and all parties were in agreement.  Preoperative antibiotics were given within 1 hour of the time of incision.  The patient was placed  in the supine position and surrendered to LMA anesthesia.  No tourniquet was applied.  Egg crate was placed beneath the well leg and a side post was utilized.  The operative leg was prepped and draped in the usual sterile fashion.     Examination under anesthesia: Range of motion 2 to 105 degrees, versus 0-1 35 on the contralateral side 1 quadrant medial and 2 quadrant lateral translation of the patella, stable to varus and valgus stress testing, stable posterior drawer testing, 2+ anterior drawer testing, 2B Lachman, 2+ pivot shift    Anterior medial and anterior lateral arthroscopic portals were created and a diagnostic arthroscopy was performed with the following findings: The medial patella facet, lateral patella facet, central ridge of the patella showed normal cartilage.  The trochlear cartilage was normal.  The medial femoral condyle showed normal cartilage and medial tibial plateau showed normal cartilage. The lateral femoral condyle showed normal cartilage and lateral tibial plateau showed normal. The Medial meniscus was stable and intact to probing and Lateral Meniscus showed a vertical tear of the posterior horn and mid body lateral meniscus approximately 2 cm in length.  There was a grade 3 rupture of the anterior cruciate ligament with positive empty wall sign.  The PCL was intact.  There was no opening to varus and valgus stress testing in either the lateral or medial compartments, respectively.  There is no significant opening to valgus stress testing and I felt that the prior MCL injury and fully healed and no intervention was required.    At this time the patient has met all randomization criteria in the event randomized into the quadricep tendon only group.  No extra-articular tenodesis    Graft harvest and preparation: A 5 cm midline incision was made and hemostasis was insured with the Bovie electrocautery.  The was obtained and was quickly identified and the medial and lateral borders were  confirmed.  We marked a section of tendon 10 mm in width from the superior pole of the patella.  We then used a small knife and we could reflect the tendon from its insertion on the superior pole of the patella and telemetry over the tendon and a grasping suture was placed to allow control of the tendon.  With care taken to maintain parallel incisions a 10 mm x 70 mm section of tendon was then harvested we then used then transected the proximal aspect of the tendon once we had adequate excursion.  A side-to-side repair was then completed with #1 Vicryl suture.    The graft was taken to the back table where it was fashioned a fibertag was affixed to both ends.  Once this was sutured in place it was tensioned at 20 pounds for 20 minutes to remove any creep within the graft.  The final graft dimensions measured 10 on the Femur by 9 on the tibia by 70 mm in length. It was soaked in vancomycin solution until it was ready for graft passage.    A debridement of the nonfunctioning ACL was then performed until we could visualize the anatomic insertion sites of the ACL on both the femoral and the tibial origin.  Remnant preservation was pursued in the tibial side and the tibial tunnel was centered midway between the medial and lateral tibial spines in line with the posterior aspect of the anterior horn of the lateral meniscus.    The arthroscope was placed into the medial portal and a 6-9 guide was placed into the lateral portal we selected our position along the lateral femoral wall.  The osseous length measured 35 mm.  A 10 mm flip cutter was then introduced through the lateral wall and a 25 mm socket was reamed.  The flip cutter was placed into the straight position and was replaced with a fiber loop suture.  Arthroscopic visualization showed excellent position of the femoral tunnel.  Excess bone from the reamings was then removed arthroscopically.    The arthroscope was then returned to the lateral portal, a tip to tip  "guide was introduced.  Our osseous length measured 40 mm.  The 9 flip cutter was placed and a 30 mm socket was reamed.  The flip cutter was then returned to the straight position and a fiber loop passing suture was placed.    At this time rasping along the meniscal synovial junction was performed.  We then used the meniscus skid and 3 Arthrex all inside fiber stitch anchors were placed across the tear site.  The anchors were set, tensioning was performed and the final locking suture was deployed.  The suture was then cut with the provided .  After placement of the anchors and knot-tying there was no further instability to probing.    The medial portal was enlarged.  We confirm that there were no soft tissue bridges.  The graft was brought up onto the field reduced to the medial portal.  The cortical button was deployed over the lateral cortex.  Approximately 20 mm of graft was then reduced into the femoral tunnel.  The remainder of the graft was reduced in the tibia.  We then \"balanced\" the graft within the knee joint with 20 mm of graft in the femoral side, 20 mm of graft in the tibial side.  Tensioning and retensioning was then performed in full extension.  Final knot-tying was performed on the tibia and the femoral side.  Examination showed Lachman of 0, no pivot shift. Final arthroscopic images showed good position of the graft, good tension to probing, clearance along the roof of the intercondylar notch in terminal extension clearance along the lateral wall and PCL in flexion.    Copious irrigation was performed an a layered closure was initiated, sterile dressings were applied and the patient was transferred to the recovery room in stable condition with stable vital signs.    ESTIMATED BLOOD LOSS: 25 mL.    TOURNIQUET TIME: No tourniquet was placed.    COMPLICATIONS: None apparent.    DRAINS: None.    SPECIMENS: None.     POSTOPERATIVE PLAN:  Weightbearing as tolerated with hinged knee brace locked " in full extension x 6 weeks  No motion x 1 week, then range of motion 0-90 degrees when sitting or therapy  Hinged knee brace on and locked when up and around, off or unlocked for sitting or therapy  OK for motion with PT or during home pt sessions during first week  Wean from crutches when able, at 6 weeks WBAT with brace on and unlocked, then wean when able  No running until 3 months  No sports until 6 months, return to game competition at 7-10 months  Shower on day 3  Start physical therapy day 3-5   daily x 4 weeks

## 2025-03-18 NOTE — ANESTHESIA PROCEDURE NOTES
"Adductor canal Procedure Note    Pre-Procedure   Staff -        Anesthesiologist:  Finn Sarmiento MD       Resident/Fellow: Dudley Reyes MD       Performed By: resident       Location: pre-op       Pre-Anesthestic Checklist: patient identified, IV checked, site marked, risks and benefits discussed, informed consent, monitors and equipment checked, pre-op evaluation, at physician/surgeon's request and post-op pain management  Timeout:       Correct Patient: Yes        Correct Procedure: Yes        Correct Site: Yes        Correct Position: Yes        Correct Laterality: Yes        Site Marked: Yes  Procedure Documentation  Procedure: Adductor canal         Diagnosis: POST OP PAIN CONTROL       Laterality: left       Patient Position: supine       Skin prep: Chloraprep       Needle Type: insulated       Needle Gauge: 21.        Needle Length (Inches): 3.13        Ultrasound guided       1. Ultrasound was used to identify targeted nerve, plexus, vascular marker, or fascial plane and place a needle adjacent to it in real-time.       2. Ultrasound was used to visualize the spread of anesthetic in close proximity to the above referenced structure.       3. A permanent image is entered into the patient's record.       4. The visualized anatomic structures appeared normal.       5. There were no apparent abnormal pathologic findings.    Assessment/Narrative         The placement was negative for: blood aspirated, painful injection and site bleeding       Paresthesias: No.       Insertion/Infusion Method: Single Shot       Complications: none    Medication(s) Administered   Bupivacaine 0.25% PF (Infiltration) - Infiltration   10 mL - 3/18/2025 7:53:00 AM  Bupivacaine liposome (Exparel) 1.3% LA inj susp (Infiltration) - Infiltration   10 mL - 3/18/2025 7:53:00 AM    FOR [facility] ([facility] [place]) ONLY:   Pain Team Contact information: please page the Pain Team Via [employer]. Search \"Pain\". During daytime hours, please page the " attending first. At night please page the resident first.

## 2025-03-18 NOTE — DISCHARGE INSTRUCTIONS
"[facility]  Home Care Following Anesthesia  For 24 hours after surgery:  Get plenty of rest.  A responsible adult must stay with you for at least 24 hours after you leave the surgery center.  Do not drive or use heavy equipment.  If you have weakness or tingling, don't drive or use heavy equipment until this feeling goes away.   Do not drink alcohol.   Avoid strenuous or risky activities.  Ask for help when climbing stairs.  You may feel lightheaded.  IF so, sit for a few minutes before standing.  Have someone help you get up.   If you have nausea (feel sick to your stomach): Drink only clear liquids such as apple juice, ginger ale, broth or 7-Up.  Rest may also help.  Be sure to drink enough fluids.  Move to a regular diet as you feel able.   You may have a slight fever.  Call the doctor if your fever is over 100 F (37.7 C) (taken under the tongue) or lasts longer than 24 hours.  You may have a dry mouth, a sore throat, muscle aches or trouble sleeping. These should go away after 24 hours.  Do not make important or legal decisions.   It is recommended to avoid smoking.        Today you received an Exparel block to numb the nerves near your surgery site.  This is a block using local anesthetic or \"numbing\" medication injected around the nerves to anesthetize or \"numb\" the area supplied by those nerves.  This block is injected into the muscle layer near your surgical site.  This medication may numb the location where you had surgery up to 72 hours.  If your surgical site is an arm or leg you should be careful with your affected limb, since it is possible to injure your limb without being aware of it due to the numbing.  Until full feeling returns, you should guard against bumping or hitting your limb, and avoid extreme hot or cold temperatures on the skin.  As the block wears off, the feeling will return as a tingling or prickly sensation near your surgical site.  You will " experince more discomfort from your incision as the feeling returns.  You may want to take a pain pill (a narcotic or Tylenol if this was prescribed by your surgeon) when you start to experience mild pain before the pain beomes more severe.  If your pain medications do not control your pain, you should notify your surgeon.    Tips for taking pain medications  To get the best pain relief possible, remember these points:  Take pain medications as directed, before pain becomes severe.  Pain medication can upset your stomach: taking it with food may help.  Constipation is a common side effect of pain medication. Drink plenty of  fluids.  Eat foods high in fiber. Take a stool softener if recommended by your doctor or pharmacist.  Do not drink alcohol, drive or operate machinery while taking pain medications.  Ask about other ways to control pain, such as with heat, ice or relaxation.    Tylenol/Acetaminophen Consumption    If you feel your pain relief is insufficient, you may take Tylenol/Acetaminophen in addition to your narcotic pain medication.   Be careful not to exceed 4,000 mg of Tylenol/Acetaminophen in a 24 hour period from all sources.  If you are taking extra strength Tylenol/acetaminophen (500 mg), the maximum dose is 8 tablets in 24 hours.  If you are taking regular strength acetaminophen (325 mg), the maximum dose is 12 tablets in 24 hours.  Tylenol 975 mg given at 7:04 am.   Ok to take more after 1:04 pm.      Toradol 30 mg given at  9:42 am.  Do not take any NSAIDs for 6 hours.  OK after 3:42 pm.  (Ibuprofen, Advil, Motrin, Naproxen, Aleve).      Call a doctor for any of the following:  Signs of infection (fever, growing tenderness at the surgery site, a large amount of drainage or bleeding, severe pain, foul-smelling drainage, redness, swelling).  It has been over 8 to 10 hours since surgery and you are still not able to urinate (pass water).  Headache for over 24 hours.  Numbness, tingling or weakness  the day after surgery (if you had spinal anesthesia).  Signs of Covid-19 infection (temperature over 100 degrees, shortness of breath, cough, loss of taste/smell, generalized body aches, persistent headache, chills, sore throat, nausea/vomiting/diarrhea)    Your doctor is:       Dr. Johnathan Gregg, Orthopaedics: [phone]               After hours and weekends call the hospital @ [phone] and ask for the resident on call for:  Orthopaedics  For emergency care, call the:  [facility] Emergency Department: [phone] (TTY for hearing impaired: [phone])    Post Operative Instructions: Regional Anesthetic for Lower Extremity with Liposomal Bupivacaine  General Information:   Regional anesthesia is when local anesthetic or  numbing  medication is injected around the nerves to anesthetize or  numb  the area supplied by that set of nerves. It is a type of analgesia used to control pain and decreases the need for narcotics following surgery.    Types of Regional Blocks:  Femoral: A block injected into the groin area of the operative leg of a patient having thigh or knee surgery.  Adductor Canal: A block injected into the mid thigh of the operative leg of a patient having knee or ankle surgery.  Popliteal or Distal Sciatic: A block injected into the back of the knee of the operative leg of patients having foot or ankle surgery.   Ankle:  An anesthetic medication is injected into the ankle of the operative leg of a patient having foot or toe surgery.     Procedure:   The type of anesthesia your doctor used to numb your leg will usually not wear off for 24-48 hours, but may last as long as 72 hours. You should be careful during that period, since it is possible to injure your leg without being aware of the injury. While your leg is numb you should:  Use crutches (minimal weight bearing until your motor and strength is completely back to normal)  Avoid striking or bumping your leg  Avoid extreme hot or  "cold    Discomfort:  You will have a tingling and prickly sensation in your leg as the feeling begins to return; you can also expect some discomfort. The amount of discomfort is unpredictable, but if you have more pain than can be controlled with pain medication, you should notify your physician.     Pain Medicine:   Begin taking your oral pain pills before bedtime and during the night to avoid a sudden onset of pain as part of the block wears off. Do not engage in drinking, driving, or hazardous occupations while taking pain medication.     Information about liposomal bupivacaine (Exparel)    What is Liposomal Bupivacaine?    Liposomal Bupivacaine is a numbing medication that can help you manage your pain after surgery.  This medication is similar to \"novacaine,\" which is often used by the dentist.  Liposomal bupivacaine is released slowly and can help control pain for up to 72 hours.    What is the purpose of Liposomal Bupivacaine?  To manage your pain after surgery  To help you sleep better, take deep breaths, walk more comfortable, and feel up to visiting with others    How is the procedure done?  Liposomal bupivacaine is a medication given by an injection.  It is usually given right before your surgery.  If this is the case, you will be awake or sedated, but you should experience minimal pain during the procedure.  For some people, the injection may be given at the very end of your surgery.  It all depends on the type of surgery and your situation.  The procedure usually takes about 5-15 minutes.  An ultrasound machine will help the anesthesiologist insert it in the right place or the surgeon will inject it under direct vision.   A needle is used to place the numbing medication under your skin.  It provides pain relief by numbing the tissue in the area where your surgeon will make the incision.    What can I expect?  You may experience numbness, tingling, or a feeling of heaviness around the area that was " "injected.  If you experience any of the follow symptoms IMMEDIATELY CALL THE REGIONAL ANESTHESIA PAIN SERVICE:  Numbness or tingling occurs in areas other than around the injection site  Blurry vision  Ringing in your ears  A metallic taste in your mouth    CALL the Regional Anesthesia Pain Service at:  [phone].  Press \"4\" for the  and let the hospital  know that you are having a problem with a nerve block and that you would like to page the \"adult care inpatient pain management/[facility] team\".  From 7 am - 5 pm, page the \"staff\" physician  From 5 pm - 7 am, page the \"resident\" physician (if no response from \"resident\" physician, call the  back and the \"staff\" physician).    You should not receive any other type of numbing medication within 4 days after receiving liposomal bupivacaine unless your anesthesiologist approves.           Safety Tips for Using Crutches    Crutch Fit:  Assume good standing posture with shoulders relaxed and crutch tips 6-8 inches out from the side of the foot.  The underarm pad should fall 2-3 fingers width below the armpit.  The handgrip is positioned level with the wrist to allow 30  flexion at the elbow.    Safety Tips:  Bear weight on your hands, not on your armpits.  Do not add extra padding to the underarm pad. This will, in effect, lengthen the crutches and increase risk of nerve injury.  Wear flat, properly fitting shoes. Do not walk in stocking feet, high heels or slippers.  Household hazards:  --Throw rugs should be removed from floors.  --Stairs should be cleared of obstacles.  --Use extra caution on slippery, highly polished, littered or uneven floor surfaces.  --Check for electric cords.  Check crutch tips for excessive wear and keep wing nuts tight.  While walking, look forward with  head up  and  eyes open.  Take equal length steps.  Use BOTH crutches.    Stairs Sequence:  UP: \"Good\" leg first, followed by  bad  leg, then crutches.  DOWN: " "Crutches, followed by  bad  leg, \"good\" leg.     Walking with Crutches:  Move both crutches forward at the same time.  Non-Weight Bearing (NWB):  Hold the involved leg up and swing through the crutches with the involved leg. The involved leg does not touch the floor.  Toe Touch Weight Bearing (TTWB): Move the involved leg forward. Rest it lightly on the floor for balance only. Step through the crutches with the uninvolved leg.  Partial Weight Bearing (PWB): Move the involved leg forward. Step down the weight of the leg only.  Step through the crutches with the uninvolved leg.  Weight Bearing As Tolerated (WBAT): Move the involved leg forward. Put as much pressure through the involved leg as you can tolerate comfortably. Then step through the crutches with the uninvolved leg.            "

## 2025-03-18 NOTE — ANESTHESIA PROCEDURE NOTES
Airway       Patient location during procedure: OR       Procedure Start/Stop Times: 3/18/2025 8:15 AM  Staff -        Anesthesiologist:  Finn Sarmiento MD       CRNA: Shana Kemp APRN CRNA       Performed By: CRNA  Consent for Airway        Urgency: elective  Indications and Patient Condition       Indications for airway management: sandro-procedural       Induction type:intravenous       Mask difficulty assessment: 0 - not attempted    Final Airway Details       Final airway type: supraglottic airway    Supraglottic Airway Details        Type: LMA       Brand: I-Gel       LMA size: 4    Post intubation assessment        Placement verified by: capnometry and chest rise        Number of attempts at approach: 1       Secured with: tape       Ease of procedure: easy       Dentition: Intact and Unchanged    Medication(s) Administered   Medication Administration Time: 3/18/2025 8:15 AM

## 2025-03-18 NOTE — ANESTHESIA POSTPROCEDURE EVALUATION
Patient: Silver Agee    Procedure: Procedure(s):  Examination under anesthesia, knee arthroscopy  quad tendon autograft anterior cruciate ligament reconstruction  meniscus surgery.       Anesthesia Type:  General    Note:  Disposition: Outpatient   Postop Pain Control: Uneventful            Sign Out: Well controlled pain   PONV: No   Neuro/Psych: Uneventful            Sign Out: Acceptable/Baseline neuro status   Airway/Respiratory: Uneventful            Sign Out: Acceptable/Baseline resp. status   CV/Hemodynamics: Uneventful            Sign Out: Acceptable CV status; No obvious hypovolemia; No obvious fluid overload   Other NRE: NONE   DID A NON-ROUTINE EVENT OCCUR?            Last vitals:  Vitals Value Taken Time   /75 03/18/25 1025   Temp 36.4  C (97.5  F) 03/18/25 1025   Pulse 87 03/18/25 1025   Resp 13 03/18/25 1025   SpO2 100 % 03/18/25 1025       Electronically Signed By: Finn Sarmiento MD  March 18, 2025  6:45 PM

## 2025-03-18 NOTE — PROCEDURES
Patient received left side Adductor nerve block  with Exparel.  Fentanyl 50mcg and Versed 1mg given. Tolerated procedure well.

## 2025-03-20 ENCOUNTER — MYC MEDICAL ADVICE (OUTPATIENT)
Dept: ORTHOPEDICS | Facility: CLINIC | Age: 25
End: 2025-03-20
Payer: COMMERCIAL

## 2025-03-20 ENCOUNTER — TELEPHONE (OUTPATIENT)
Dept: ORTHOPEDICS | Facility: CLINIC | Age: 25
End: 2025-03-20
Payer: COMMERCIAL

## 2025-03-20 NOTE — TELEPHONE ENCOUNTER
"Other: Requesting c/b ASAP regarding [facility] msg:     \"Hi, so I had ACL surgery on Tuesday morning and I woke up this morning around 5am with some numbness in my shin that I hadn't noticed before. I called the on call resident who said to elevate the leg and loosen the brace/bandage a bit, and to contact my team if it persisted. My shin is still numb and it has moved to the inside of my calf a bit more as well, wondering if this is something to be concerned about? I haven't taken any pain medication since 11pm last night.\"       Could we send this information to you in [employer] or would you prefer to receive a phone call?:   Patient would prefer a phone call   Okay to leave a detailed message?: No at Cell number on file:    Telephone Information:   Mobile [phone]   Mobile [phone]       "

## 2025-03-20 NOTE — CONFIDENTIAL NOTE
Called patient and discussed her numbness. She states her shin is starting to get feeling back but now she notes numbness on the side of her shin that was not present before. She was not sure if this was normal. She has complete control of her foot function. When sitting her foot does not drop down. She is starting to see swelling in her knee but not down the foot. We discussed that having numbness from the knee to the foot is normal after  a nerve block and as swelling starts to push on the knee. If she looses control of her foot function, she should call in again. Leg is warm and normal coloring.   No other questions or concerns.  Fransisca Reina RN

## 2025-03-25 ENCOUNTER — OFFICE VISIT (OUTPATIENT)
Dept: ORTHOPEDICS | Facility: CLINIC | Age: 25
End: 2025-03-25
Payer: COMMERCIAL

## 2025-03-25 DIAGNOSIS — Z48.89 ENCOUNTER FOR POSTOPERATIVE CARE: Primary | ICD-10-CM

## 2025-03-25 PROCEDURE — 99024 POSTOP FOLLOW-UP VISIT: CPT

## 2025-03-25 NOTE — PROGRESS NOTES
Chief Complaint:   Follow up, DOS 3/18/25 with Dr. Gregg    Procedures:  Examination under anesthesia left knee  Left knee arthroscopy  Quadricep tendon ACL reconstruction  Lateral meniscus repair    History:  Silver Agee is a 25 year old patient status post above procedure, here for follow-up.  She has done very well since surgery.  Pain is controlled with Tylenol as needed, taking aspirin for DVT prophylaxis as prescribed.  She did not react well to oxycodone, she is returning the rest of her prescription to the pharmacy today.  She has remained in hinged knee brace on and locked while up.  Working with physical therapy, has future visits scheduled.  She has plans to return to [facility] in the next 2 weeks.  Silver has questions about advancing activity and postop plan.  She is here with her family, they do not have any specific concerns today.      Exam:     General: Awake, Alert, and oriented. Articulates and communicates with a normal affect     Left lower Extremity:  Incisions well healed without evidence of infection, no erythema, drainage, or wound dehiscence   Normal post-operative effusion and ecchymosis  Straight leg raise with 15 degree extensor lag  Range of motion and stability exam not performed  TA/Gsc/EHL/FHL with 5/5 strength  Distal pulses palpable, toes are warm and well perfused   Gait: Partial weightbearing with use of 2 crutches    Imaging:  No new imaging    Medications:     Current Outpatient Medications:     acetaminophen (TYLENOL) 325 MG tablet, Take 2 tablets (650 mg) by mouth every 4 hours as needed for mild pain., Disp: 50 tablet, Rfl: 0    aspirin 81 MG EC tablet, Take 1 tablet (81 mg) by mouth 2 times daily., Disp: 60 tablet, Rfl: 0    hydrOXYzine HCl (ATARAX) 25 MG tablet, Take 1 tablet (25 mg) by mouth 3 times daily as needed for itching., Disp: 20 tablet, Rfl: 0    levonorgestrel (MIRENA) 52 MG (20 mcg/day) IUD, by Intrauterine route once., Disp: , Rfl:      ondansetron (ZOFRAN ODT) 4 MG ODT tab, Take 1 tablet (4 mg) by mouth every 8 hours as needed for nausea., Disp: 4 tablet, Rfl: 0    oxyCODONE (ROXICODONE) 5 MG tablet, Take 1-2 tablets (5-10 mg) by mouth every 4 hours as needed for moderate to severe pain., Disp: 20 tablet, Rfl: 0    senna-docusate (SENOKOT-S/PERICOLACE) 8.6-50 MG tablet, Take 1-2 tablets by mouth 2 times daily., Disp: 30 tablet, Rfl: 0    Assessment:  Doing well 1 weeks s/p above procedure with Dr. Pichardo. Basic wound cares were performed today.  Nylon sutures were removed, I did not appreciate signs of infection. We discussed the plan below, all questions were answered to the best of my ability.     Plan:   -Weight bearing: WBAT with hinged knee brace on and locked in extension x 6 weeks  -Range of motion 0 to 90 degrees x 6 weeks  -Brace: Hinged knee brace on and locked while up.  Okay to unlock or remove brace while sitting, with therapy, or for hygiene  -Continue work with physical therapy  -DVT prophylaxis:  mg daily x 4 weeks  -Follow up: 6 weeks with Dr. Cristino Lopez PA-C 3/25/2025 3:28 PM  Orthopedic Surgery

## 2025-03-25 NOTE — LETTER
3/25/2025      Silver Agee  [address] [address]      Dear Colleague,    Thank you for referring your patient, Silver Agee, to the [facility] [place]. Please see a copy of my visit note below.    Chief Complaint:   Follow up, DOS 3/18/25 with Dr. Gregg    Procedures:  Examination under anesthesia left knee  Left knee arthroscopy  Quadricep tendon ACL reconstruction  Lateral meniscus repair    History:  Silver Agee is a 25 year old patient status post above procedure, here for follow-up.  She has done very well since surgery.  Pain is controlled with Tylenol as needed, taking aspirin for DVT prophylaxis as prescribed.  She did not react well to oxycodone, she is returning the rest of her prescription to the pharmacy today.  She has remained in hinged knee brace on and locked while up.  Working with physical therapy, has future visits scheduled.  She has plans to return to [facility] in the next 2 weeks.  Silver has questions about advancing activity and postop plan.  She is here with her family, they do not have any specific concerns today.      Exam:     General: Awake, Alert, and oriented. Articulates and communicates with a normal affect     Left lower Extremity:  Incisions well healed without evidence of infection, no erythema, drainage, or wound dehiscence   Normal post-operative effusion and ecchymosis  Straight leg raise with 15 degree extensor lag  Range of motion and stability exam not performed  TA/Gsc/EHL/FHL with 5/5 strength  Distal pulses palpable, toes are warm and well perfused   Gait: Partial weightbearing with use of 2 crutches    Imaging:  No new imaging    Medications:     Current Outpatient Medications:      acetaminophen (TYLENOL) 325 MG tablet, Take 2 tablets (650 mg) by mouth every 4 hours as needed for mild pain., Disp: 50 tablet, Rfl: 0     aspirin 81 MG EC tablet, Take 1 tablet (81 mg) by mouth 2 times daily., Disp: 60  tablet, Rfl: 0     hydrOXYzine HCl (ATARAX) 25 MG tablet, Take 1 tablet (25 mg) by mouth 3 times daily as needed for itching., Disp: 20 tablet, Rfl: 0     levonorgestrel (MIRENA) 52 MG (20 mcg/day) IUD, by Intrauterine route once., Disp: , Rfl:      ondansetron (ZOFRAN ODT) 4 MG ODT tab, Take 1 tablet (4 mg) by mouth every 8 hours as needed for nausea., Disp: 4 tablet, Rfl: 0     oxyCODONE (ROXICODONE) 5 MG tablet, Take 1-2 tablets (5-10 mg) by mouth every 4 hours as needed for moderate to severe pain., Disp: 20 tablet, Rfl: 0     senna-docusate (SENOKOT-S/PERICOLACE) 8.6-50 MG tablet, Take 1-2 tablets by mouth 2 times daily., Disp: 30 tablet, Rfl: 0    Assessment:  Doing well 1 weeks s/p above procedure with Dr. Pichardo. Basic wound cares were performed today.  Nylon sutures were removed, I did not appreciate signs of infection. We discussed the plan below, all questions were answered to the best of my ability.     Plan:   -Weight bearing: WBAT with hinged knee brace on and locked in extension x 6 weeks  -Range of motion 0 to 90 degrees x 6 weeks  -Brace: Hinged knee brace on and locked while up.  Okay to unlock or remove brace while sitting, with therapy, or for hygiene  -Continue work with physical therapy  -DVT prophylaxis:  mg daily x 4 weeks  -Follow up: 6 weeks with Dr. Cristino Lopez PA-C 3/25/2025 3:28 PM  Orthopedic Surgery          Again, thank you for allowing me to participate in the care of your patient.        Sincerely,        Amanda Lopez PA-C    Electronically signed

## 2025-03-25 NOTE — NURSING NOTE
Reason For Visit:   Chief Complaint   Patient presents with    Surgical Followup     DOS: 3/18/2025 - Examination under anesthesia, knee arthroscopy quad tendon autograft anterior cruciate ligament reconstruction meniscus surgery. Dr. Gregg            LMP  (LMP Unknown)     Pain Assessment  Patient Currently in Pain: Tish Connors, MARLON

## 2025-03-28 ENCOUNTER — THERAPY VISIT (OUTPATIENT)
Age: 25
End: 2025-03-28
Payer: COMMERCIAL

## 2025-03-28 DIAGNOSIS — Z98.890 S/P ACL REPAIR: Primary | ICD-10-CM

## 2025-03-28 PROCEDURE — 97110 THERAPEUTIC EXERCISES: CPT | Mod: GP | Performed by: PHYSICAL THERAPIST

## 2025-03-28 PROCEDURE — 97140 MANUAL THERAPY 1/> REGIONS: CPT | Mod: GP | Performed by: PHYSICAL THERAPIST

## 2025-03-31 PROBLEM — Z98.890 S/P ACL REPAIR: Status: ACTIVE | Noted: 2025-03-31

## 2025-04-04 PROBLEM — Z98.890 S/P ACL REPAIR: Status: RESOLVED | Noted: 2025-03-31 | Resolved: 2025-04-04

## 2025-04-23 DIAGNOSIS — Z98.890 S/P ACL RECONSTRUCTION: Primary | ICD-10-CM

## 2025-04-28 ENCOUNTER — ANCILLARY PROCEDURE (OUTPATIENT)
Dept: GENERAL RADIOLOGY | Facility: CLINIC | Age: 25
End: 2025-04-28
Attending: ORTHOPAEDIC SURGERY
Payer: COMMERCIAL

## 2025-04-28 ENCOUNTER — OFFICE VISIT (OUTPATIENT)
Dept: ORTHOPEDICS | Facility: CLINIC | Age: 25
End: 2025-04-28
Payer: COMMERCIAL

## 2025-04-28 VITALS — WEIGHT: 175 LBS | HEIGHT: 69 IN | BODY MASS INDEX: 25.92 KG/M2

## 2025-04-28 DIAGNOSIS — G89.29 CHRONIC PAIN OF LEFT KNEE: Primary | ICD-10-CM

## 2025-04-28 DIAGNOSIS — Z98.890 S/P ACL RECONSTRUCTION: ICD-10-CM

## 2025-04-28 DIAGNOSIS — M25.562 CHRONIC PAIN OF LEFT KNEE: Primary | ICD-10-CM

## 2025-04-28 PROCEDURE — 73560 X-RAY EXAM OF KNEE 1 OR 2: CPT | Mod: LT | Performed by: RADIOLOGY

## 2025-04-28 NOTE — PROGRESS NOTES
DIAGNOSIS:   Grade 3 rupture of the left anterior cruciate ligament  Lateral meniscus tear    PROCEDURES: 03/18/2025  Examination under anesthesia left knee  Left knee arthroscopy  Quadricep tendon ACL reconstruction  Lateral meniscus repair    HISTORY:  Silver is a very pleasant 22-year-old female who returns to clinic for routine 6-week follow-up after the above-stated procedure.  She has been doing reasonably well.  She has been diligently adhering to all restrictions.  She has been participating regularly in physical therapy.  This is been going reasonably well.  She is no longer taking anything for pain medication.  She does continue to have a little bit of pain behind her knee as well as some swelling.  She is looking forward to unlocking her brace.  She most recently graduated from [employer] and will be starting her job at [employer] this summer.    EXAM:     General: Awake, Alert, and oriented. Articulates and communicates with a normal affect     Left Lower Extremity:  Incisions well healed without evidence of infection  Normal post-operative effusion and ecchymosis  Range of motion and stability exam not performed.  Knee range of motion 0 to 87 degrees  Neurovascularly intact    IMAGING:  Radiographs of the left knee from 04/28/2025 were independently reviewed by me and findings were discussed with the patient today. The imaging demonstrates typical appearing postoperative knee x-rays for a quadricep tendon ACL reconstruction with appropriate location of tibial and femoral buttons..    ASSESSMENT:  6-week follow-up of above-stated procedure    PLAN:   1.  Proceed with recovery course per physical therapy protocol  2.  Return to clinic in approximately 6 weeks for repeat evaluation    Emerson Jose MD  Orthopaedic Surgery PGY-5

## 2025-04-28 NOTE — PROGRESS NOTES
Patient seen and examined with the resident. I also personally reviewed the images and interpreted the imaging myself.     Assesment: 6 weeks status post ACL reconstruction quad tendon autograft.  Doing well.    Plan: Weightbearing: WBAT  Range of Motion: No range of motion restrictions wean from brace whenever able  Pain Medications: We reviewed post-operative pain medications at today's visit. The patient has stopped all opioid pain medications and no further refills are required  Extension: We reviewed the importance of full knee extension and demonstrated the relevant exercises as appropriate  Crutches/Brace: Patient no longer requires the hinged knee brace or crutches.   Acitivity Restrictions:  Discussed that this is the dangerous time after ACL reconstruction  Reviewed activity restrictions at today's visit  Goal to progress strength and motion to allow straight line running at three months from the date of surgery    Follow up: 6 weeks with no new radiographs needed     I agree with history, physical and imaging as well as the assessment and plan as detailed by Dr. Jose.

## 2025-04-28 NOTE — NURSING NOTE
"Reason For Visit:   Chief Complaint   Patient presents with    RECHECK     DOS: 3/18/25 left knee arthroscopy, ACL reconstruction quad tendon, meniscus surgery     Date of surgery: 3/18/25  Type of surgery:   PROCEDURE:  Examination under anesthesia left knee  Left knee arthroscopy  Quadricep tendon ACL reconstruction  Lateral meniscus repair    Overall doing well, still has some swelling.    SANE Score  Left Knee: 25  Right Knee: 100    Pain Assessment  Patient Currently in Pain: Denies    Ht 1.753 m (5' 9\")   Wt 79.4 kg (175 lb)   LMP  (LMP Unknown)   BMI 25.84 kg/m           Allergies   Allergen Reactions    Polymyxin B-Trimethoprim Swelling    Polytrim [Polymyxin B-Trimethoprim] Swelling       Current Outpatient Medications   Medication Sig Dispense Refill    acetaminophen (TYLENOL) 325 MG tablet Take 2 tablets (650 mg) by mouth every 4 hours as needed for mild pain. (Patient not taking: Reported on 4/28/2025) 50 tablet 0    aspirin 81 MG EC tablet Take 1 tablet (81 mg) by mouth 2 times daily. (Patient not taking: Reported on 4/28/2025) 60 tablet 0    hydrOXYzine HCl (ATARAX) 25 MG tablet Take 1 tablet (25 mg) by mouth 3 times daily as needed for itching. (Patient not taking: Reported on 4/28/2025) 20 tablet 0    levonorgestrel (MIRENA) 52 MG (20 mcg/day) IUD by Intrauterine route once.      ondansetron (ZOFRAN ODT) 4 MG ODT tab Take 1 tablet (4 mg) by mouth every 8 hours as needed for nausea. (Patient not taking: Reported on 4/28/2025) 4 tablet 0    oxyCODONE (ROXICODONE) 5 MG tablet Take 1-2 tablets (5-10 mg) by mouth every 4 hours as needed for moderate to severe pain. (Patient not taking: Reported on 4/28/2025) 20 tablet 0    senna-docusate (SENOKOT-S/PERICOLACE) 8.6-50 MG tablet Take 1-2 tablets by mouth 2 times daily. (Patient not taking: Reported on 4/28/2025) 30 tablet 0     No current facility-administered medications for this visit.         Marianne Redding, ATC    "

## 2025-04-28 NOTE — LETTER
4/28/2025      Silver Agee  [address] [address]      Dear Colleague,    Thank you for referring your patient, Silver Agee, to the [facility] [place]. Please see a copy of my visit note below.    DIAGNOSIS:   Grade 3 rupture of the left anterior cruciate ligament  Lateral meniscus tear    PROCEDURES: 03/18/2025  Examination under anesthesia left knee  Left knee arthroscopy  Quadricep tendon ACL reconstruction  Lateral meniscus repair    HISTORY:  Silver is a very pleasant 22-year-old female who returns to clinic for routine 6-week follow-up after the above-stated procedure.  She has been doing reasonably well.  She has been diligently adhering to all restrictions.  She has been participating regularly in physical therapy.  This is been going reasonably well.  She is no longer taking anything for pain medication.  She does continue to have a little bit of pain behind her knee as well as some swelling.  She is looking forward to unlocking her brace.  She most recently graduated from [employer] and will be starting her job at [employer] this summer.    EXAM:     General: Awake, Alert, and oriented. Articulates and communicates with a normal affect     Left Lower Extremity:  Incisions well healed without evidence of infection  Normal post-operative effusion and ecchymosis  Range of motion and stability exam not performed.  Knee range of motion 0 to 87 degrees  Neurovascularly intact    IMAGING:  Radiographs of the left knee from 04/28/2025 were independently reviewed by me and findings were discussed with the patient today. The imaging demonstrates typical appearing postoperative knee x-rays for a quadricep tendon ACL reconstruction with appropriate location of tibial and femoral buttons..    ASSESSMENT:  6-week follow-up of above-stated procedure    PLAN:   1.  Proceed with recovery course per physical therapy protocol  2.  Return to clinic in  approximately 6 weeks for repeat evaluation    Emerson Jose MD  Orthopaedic Surgery PGY-5      Patient seen and examined with the resident. I also personally reviewed the images and interpreted the imaging myself.     Assesment: 6 weeks status post ACL reconstruction quad tendon autograft.  Doing well.    Plan: Weightbearing: WBAT  Range of Motion: No range of motion restrictions wean from brace whenever able  Pain Medications: We reviewed post-operative pain medications at today's visit. The patient has stopped all opioid pain medications and no further refills are required  Extension: We reviewed the importance of full knee extension and demonstrated the relevant exercises as appropriate  Crutches/Brace: Patient no longer requires the hinged knee brace or crutches.   Acitivity Restrictions:  Discussed that this is the dangerous time after ACL reconstruction  Reviewed activity restrictions at today's visit  Goal to progress strength and motion to allow straight line running at three months from the date of surgery    Follow up: 6 weeks with no new radiographs needed     I agree with history, physical and imaging as well as the assessment and plan as detailed by Dr. Jose.       Again, thank you for allowing me to participate in the care of your patient.        Sincerely,        Johnathan Gregg MD    Electronically signed

## 2025-06-02 ENCOUNTER — OFFICE VISIT (OUTPATIENT)
Dept: ORTHOPEDICS | Facility: CLINIC | Age: 25
End: 2025-06-02
Payer: COMMERCIAL

## 2025-06-02 VITALS — WEIGHT: 175 LBS | HEIGHT: 69 IN | BODY MASS INDEX: 25.92 KG/M2

## 2025-06-02 DIAGNOSIS — S83.512A RUPTURE OF ANTERIOR CRUCIATE LIGAMENT OF LEFT KNEE, INITIAL ENCOUNTER: Primary | ICD-10-CM

## 2025-06-02 PROCEDURE — 99024 POSTOP FOLLOW-UP VISIT: CPT | Performed by: ORTHOPAEDIC SURGERY

## 2025-06-02 NOTE — LETTER
6/2/2025      Silver Agee  [address] [place] [address]      Dear Colleague,    Thank you for referring your patient, Silver Agee, to the [facility] [place]. Please see a copy of my visit note below.    DIAGNOSIS:   Grade 3 rupture of the left anterior cruciate ligament  Lateral meniscus tear    PROCEDURES: 03/18/2025  Examination under anesthesia left knee  Left knee arthroscopy  Quadricep tendon ACL reconstruction  Lateral meniscus repair    HISTORY:  3 months following the above.  Pain control.  Off opioids.  Doing physical therapy.  Making progress.    EXAM:     General: Awake, Alert, and oriented. Articulates and communicates with a normal affect     Left Lower Extremity:  Incisions well healed without evidence of infection  No post-operative effusion or ecchymosis  Range of motion is 0 to 115 degrees   Lachman 0, no pivot shift   Neurovascularly intact    IMAGING:  No new imaging    ASSESSMENT:  12 week follow-up of above-stated procedure    PLAN:   Weightbearing: WBAT  Range of Motion: No range of motion restrictions.   Acitivity Restrictions:  May do straight line running at this time  No running distance or pace restrictions  No cutting, pivoting, or start-stop running  Goal to build strength, endurance, and confidence to allow sports in 3 months time (6 months from the date of surgery)  Brace: Discussed knee bracing options for sports including neoprene knee sleeve ACL functional bracing.   Discussed post-ACL reconstruction therapy program  Follow up: 3 months no XRays with an ACL functional test as completed by physical therapy.  We are going to set this up for a limited physical therapy visit in our facility here      Again, thank you for allowing me to participate in the care of your patient.        Sincerely,        Johnathan Gregg MD    Electronically signed

## 2025-06-02 NOTE — NURSING NOTE
"Reason For Visit:   Chief Complaint   Patient presents with    RECHECK     DOS: 3/18/25 left knee arthroscopy, ACL reconstruction quad tendon, meniscus surgery     Date of surgery: 3/18/25  Type of surgery:   PROCEDURE:  Examination under anesthesia left knee  Left knee arthroscopy  Quadricep tendon ACL reconstruction  Lateral meniscus repair    Overall doing well. Still has pain on the back of her knee with knee flexion exercises.     SANE Score  Left Knee: 50  Right Knee: 100    Pain Assessment  Patient Currently in Pain: Denies    Ht 1.753 m (5' 9\")   Wt 79.4 kg (175 lb)   BMI 25.84 kg/m           Allergies   Allergen Reactions    Polymyxin B-Trimethoprim Swelling    Polytrim [Polymyxin B-Trimethoprim] Swelling       Current Outpatient Medications   Medication Sig Dispense Refill    levonorgestrel (MIRENA) 52 MG (20 mcg/day) IUD by Intrauterine route once.       No current facility-administered medications for this visit.         Marianne Redding, ATC    "

## 2025-06-02 NOTE — PROGRESS NOTES
DIAGNOSIS:   Grade 3 rupture of the left anterior cruciate ligament  Lateral meniscus tear    PROCEDURES: 03/18/2025  Examination under anesthesia left knee  Left knee arthroscopy  Quadricep tendon ACL reconstruction  Lateral meniscus repair    HISTORY:  3 months following the above.  Pain control.  Off opioids.  Doing physical therapy.  Making progress.    EXAM:     General: Awake, Alert, and oriented. Articulates and communicates with a normal affect     Left Lower Extremity:  Incisions well healed without evidence of infection  No post-operative effusion or ecchymosis  Range of motion is 0 to 115 degrees   Lachman 0, no pivot shift   Neurovascularly intact    IMAGING:  No new imaging    ASSESSMENT:  12 week follow-up of above-stated procedure    PLAN:   Weightbearing: WBAT  Range of Motion: No range of motion restrictions.   Acitivity Restrictions:  May do straight line running at this time  No running distance or pace restrictions  No cutting, pivoting, or start-stop running  Goal to build strength, endurance, and confidence to allow sports in 3 months time (6 months from the date of surgery)  Brace: Discussed knee bracing options for sports including neoprene knee sleeve ACL functional bracing.   Discussed post-ACL reconstruction therapy program  Follow up: 3 months no XRays with an ACL functional test as completed by physical therapy.  We are going to set this up for a limited physical therapy visit in our facility here

## 2025-06-03 ENCOUNTER — TELEPHONE (OUTPATIENT)
Dept: ORTHOPEDICS | Facility: CLINIC | Age: 25
End: 2025-06-03
Payer: COMMERCIAL

## 2025-06-03 NOTE — TELEPHONE ENCOUNTER
Patient Contacted for thewriter to schedule patient follow up in September for 3 mojnth follow up. Patient stated she will call back later on to schedule. She stated she was driving and that she will look in her MyChart to get scheduling number. Patient declined to schedule

## 2025-07-12 ENCOUNTER — HEALTH MAINTENANCE LETTER (OUTPATIENT)
Age: 25
End: 2025-07-12

## (undated) DEVICE — SU VICRYL 1 CT-2 27" J335H

## (undated) DEVICE — SU MONOCRYL 3-0 PS-1 27" Y936H

## (undated) DEVICE — SU ETHILON 3-0 PS-1 18" 1663H

## (undated) DEVICE — PEN MARKING SKIN W/PAPER RULER 31145785

## (undated) DEVICE — PACK ACL SUPPLEMENT CUSTOM ASC

## (undated) DEVICE — PIN GUIDE ARTHREX 2.4MM DRILL  AR-1250L

## (undated) DEVICE — BUR ARTHREX COOLCUT SABRE 4.0MMX13CM AR-8400SR

## (undated) DEVICE — ESU PENCIL SMOKE EVAC W/ROCKER SWITCH 0703-047-000

## (undated) DEVICE — ABLATOR ARTHREX APOLLO RF MP90 ASPIRATING 90DEG AR-9811

## (undated) DEVICE — PAD ARMBOARD FOAM EGGCRATE COVIDEN 3114367

## (undated) DEVICE — ESU GROUND PAD ADULT W/CORD E7507

## (undated) DEVICE — LINEN TOWEL PACK X5 5464

## (undated) DEVICE — LINEN ORTHO PACK 5446

## (undated) DEVICE — SU VICRYL 2-0 CT-2 27" UND J269H

## (undated) DEVICE — PACK ARTHROSCOPY CUSTOM ASC

## (undated) DEVICE — BLADE SAW OSCILLATING STRK MICRO 9X10X0.51MM 2296-033-220

## (undated) DEVICE — TUBING SYSTEM ARTHREX PATIENT REDEUCE AR-6421

## (undated) DEVICE — SU ETHIBOND 1 CT-1 30" X425H

## (undated) DEVICE — IMPLANTABLE DEVICE
Type: IMPLANTABLE DEVICE | Site: KNEE | Status: NON-FUNCTIONAL
Brand: FIBERSTITCH™ IMPLANT 1.5, 24° CURVED WITH TWO POLYESTER IMPLANTS AND 2-0 FIBERWI

## (undated) DEVICE — DRSG STERI STRIP 1/2X4" R1547

## (undated) DEVICE — PREP CHLORAPREP 26ML TINTED HI-LITE ORANGE 930815

## (undated) DEVICE — Device

## (undated) DEVICE — GLOVE BIOGEL PI MICRO INDICATOR UNDERGLOVE SZ 8.0 48980

## (undated) DEVICE — SOL NACL 0.9% IRRIG 3000ML BAG 2B7477

## (undated) DEVICE — GLOVE BIOGEL PI MICRO SZ 8.0 48580

## (undated) DEVICE — DRAPE TIBURON TOP SHEET 100X60" 29352

## (undated) DEVICE — SUCTION MANIFOLD NEPTUNE 2 SYS 4 PORT 0702-020-000

## (undated) RX ORDER — ACETAMINOPHEN 325 MG/1
TABLET ORAL
Status: DISPENSED
Start: 2025-03-18

## (undated) RX ORDER — FENTANYL CITRATE 50 UG/ML
INJECTION, SOLUTION INTRAMUSCULAR; INTRAVENOUS
Status: DISPENSED
Start: 2025-03-18

## (undated) RX ORDER — PROPOFOL 10 MG/ML
INJECTION, EMULSION INTRAVENOUS
Status: DISPENSED
Start: 2025-03-18

## (undated) RX ORDER — EPINEPHRINE 1 MG/ML
INJECTION, SOLUTION INTRAMUSCULAR; SUBCUTANEOUS
Status: DISPENSED
Start: 2025-03-18

## (undated) RX ORDER — GLYCOPYRROLATE 0.2 MG/ML
INJECTION, SOLUTION INTRAMUSCULAR; INTRAVENOUS
Status: DISPENSED
Start: 2025-03-18

## (undated) RX ORDER — HYDROMORPHONE HYDROCHLORIDE 1 MG/ML
INJECTION, SOLUTION INTRAMUSCULAR; INTRAVENOUS; SUBCUTANEOUS
Status: DISPENSED
Start: 2025-03-18

## (undated) RX ORDER — CEFAZOLIN SODIUM 2 G/50ML
SOLUTION INTRAVENOUS
Status: DISPENSED
Start: 2025-03-18

## (undated) RX ORDER — ONDANSETRON 2 MG/ML
INJECTION INTRAMUSCULAR; INTRAVENOUS
Status: DISPENSED
Start: 2025-03-18

## (undated) RX ORDER — BUPIVACAINE HYDROCHLORIDE 2.5 MG/ML
INJECTION, SOLUTION EPIDURAL; INFILTRATION; INTRACAUDAL; PERINEURAL
Status: DISPENSED
Start: 2025-03-18

## (undated) RX ORDER — VANCOMYCIN HYDROCHLORIDE 1 G/20ML
INJECTION, POWDER, LYOPHILIZED, FOR SOLUTION INTRAVENOUS
Status: DISPENSED
Start: 2025-03-18

## (undated) RX ORDER — DEXAMETHASONE SODIUM PHOSPHATE 4 MG/ML
INJECTION, SOLUTION INTRA-ARTICULAR; INTRALESIONAL; INTRAMUSCULAR; INTRAVENOUS; SOFT TISSUE
Status: DISPENSED
Start: 2025-03-18

## (undated) RX ORDER — HYDROXYZINE HYDROCHLORIDE 25 MG/1
TABLET, FILM COATED ORAL
Status: DISPENSED
Start: 2025-03-18

## (undated) RX ORDER — OXYCODONE HYDROCHLORIDE 5 MG/1
TABLET ORAL
Status: DISPENSED
Start: 2025-03-18

## (undated) RX ORDER — KETOROLAC TROMETHAMINE 30 MG/ML
INJECTION, SOLUTION INTRAMUSCULAR; INTRAVENOUS
Status: DISPENSED
Start: 2025-03-18